# Patient Record
Sex: MALE | URBAN - NONMETROPOLITAN AREA
[De-identification: names, ages, dates, MRNs, and addresses within clinical notes are randomized per-mention and may not be internally consistent; named-entity substitution may affect disease eponyms.]

---

## 2019-09-25 ENCOUNTER — APPOINTMENT (RX ONLY)
Dept: URBAN - NONMETROPOLITAN AREA CLINIC 15 | Facility: CLINIC | Age: 69
Setting detail: DERMATOLOGY
End: 2019-09-25

## 2019-09-25 DIAGNOSIS — L82.1 OTHER SEBORRHEIC KERATOSIS: ICD-10-CM

## 2019-09-25 DIAGNOSIS — L91.8 OTHER HYPERTROPHIC DISORDERS OF THE SKIN: ICD-10-CM

## 2019-09-25 DIAGNOSIS — Z71.89 OTHER SPECIFIED COUNSELING: ICD-10-CM

## 2019-09-25 DIAGNOSIS — L73.8 OTHER SPECIFIED FOLLICULAR DISORDERS: ICD-10-CM

## 2019-09-25 PROBLEM — E13.9 OTHER SPECIFIED DIABETES MELLITUS WITHOUT COMPLICATIONS: Status: ACTIVE | Noted: 2019-09-25

## 2019-09-25 PROCEDURE — 99202 OFFICE O/P NEW SF 15 MIN: CPT

## 2019-09-25 PROCEDURE — ? COUNSELING

## 2019-09-25 PROCEDURE — ? LIQUID NITROGEN

## 2019-09-25 ASSESSMENT — LOCATION DETAILED DESCRIPTION DERM
LOCATION DETAILED: LEFT LATERAL SUBMANDIBULAR CHEEK
LOCATION DETAILED: RIGHT LATERAL BUCCAL CHEEK
LOCATION DETAILED: LEFT INFERIOR CENTRAL MALAR CHEEK
LOCATION DETAILED: LEFT CENTRAL LATERAL NECK
LOCATION DETAILED: LEFT SUPERIOR ANTERIOR NECK
LOCATION DETAILED: LEFT LATERAL ZYGOMA
LOCATION DETAILED: LEFT SUPERIOR CENTRAL MALAR CHEEK
LOCATION DETAILED: LEFT INFERIOR FOREHEAD
LOCATION DETAILED: RIGHT INFERIOR CENTRAL MALAR CHEEK
LOCATION DETAILED: RIGHT CENTRAL MANDIBULAR CHEEK
LOCATION DETAILED: RIGHT LATERAL MANDIBULAR CHEEK

## 2019-09-25 ASSESSMENT — LOCATION SIMPLE DESCRIPTION DERM
LOCATION SIMPLE: NECK
LOCATION SIMPLE: LEFT CHEEK
LOCATION SIMPLE: LEFT ANTERIOR NECK
LOCATION SIMPLE: LEFT ZYGOMA
LOCATION SIMPLE: LEFT FOREHEAD
LOCATION SIMPLE: RIGHT CHEEK

## 2019-09-25 ASSESSMENT — LOCATION ZONE DERM
LOCATION ZONE: NECK
LOCATION ZONE: FACE

## 2019-09-25 NOTE — PROCEDURE: LIQUID NITROGEN
Bill Insurance (You Assume Risk Of Denial Or Audit By Selecting Yes): No
Detail Level: Detailed
Duration Of Freeze Thaw-Cycle (Seconds): 0
Medical Necessity Information: It is in your best interest to select a reason for this procedure from the list below. All of these items fulfill various CMS LCD requirements except the new and changing color options.
Consent: The patient's consent was obtained including but not limited to risks of crusting, scabbing, blistering, scarring, darker or lighter pigmentary change, recurrence, incomplete removal and infection.
Medical Necessity Clause: This procedure was medically necessary because the lesions that were treated were:  If lesion does not resolve, bx is needed.
Post-Care Instructions: I reviewed with the patient in detail post-care instructions. Patient is to wear sunprotection, and avoid picking at any of the treated lesions. Pt may apply Vaseline to crusted or scabbing areas.

## 2019-12-27 ENCOUNTER — TELEPHONE (OUTPATIENT)
Dept: CARDIOLOGY | Facility: MEDICAL CENTER | Age: 69
End: 2019-12-27

## 2019-12-27 ENCOUNTER — OFFICE VISIT (OUTPATIENT)
Dept: CARDIOLOGY | Facility: MEDICAL CENTER | Age: 69
End: 2019-12-27
Payer: MEDICARE

## 2019-12-27 VITALS
HEIGHT: 69 IN | SYSTOLIC BLOOD PRESSURE: 128 MMHG | BODY MASS INDEX: 46.04 KG/M2 | HEART RATE: 84 BPM | WEIGHT: 310.85 LBS | DIASTOLIC BLOOD PRESSURE: 84 MMHG | OXYGEN SATURATION: 95 %

## 2019-12-27 DIAGNOSIS — I47.10 PAROXYSMAL SUPRAVENTRICULAR TACHYCARDIA: ICD-10-CM

## 2019-12-27 DIAGNOSIS — E11.69 TYPE 2 DIABETES MELLITUS WITH OTHER SPECIFIED COMPLICATION, WITHOUT LONG-TERM CURRENT USE OF INSULIN (HCC): ICD-10-CM

## 2019-12-27 DIAGNOSIS — R94.31 ABNORMAL ELECTROCARDIOGRAM (ECG) (EKG): ICD-10-CM

## 2019-12-27 DIAGNOSIS — R06.09 DYSPNEA ON EXERTION: ICD-10-CM

## 2019-12-27 PROBLEM — E11.9 DIABETES MELLITUS (HCC): Status: ACTIVE | Noted: 2019-12-27

## 2019-12-27 LAB — EKG IMPRESSION: NORMAL

## 2019-12-27 PROCEDURE — 93000 ELECTROCARDIOGRAM COMPLETE: CPT | Performed by: INTERNAL MEDICINE

## 2019-12-27 PROCEDURE — 99204 OFFICE O/P NEW MOD 45 MIN: CPT | Performed by: INTERNAL MEDICINE

## 2019-12-27 RX ORDER — GLIPIZIDE 10 MG/1
10 TABLET ORAL 3 TIMES DAILY
COMMUNITY
Start: 2019-10-29

## 2019-12-27 RX ORDER — SIMVASTATIN 80 MG
80 TABLET ORAL
COMMUNITY
Start: 2019-10-21 | End: 2019-12-27

## 2019-12-27 RX ORDER — TRAMADOL HYDROCHLORIDE 50 MG/1
50 TABLET ORAL
COMMUNITY
Start: 2019-11-06 | End: 2021-08-02

## 2019-12-27 RX ORDER — GABAPENTIN 600 MG/1
600 TABLET ORAL 2 TIMES DAILY
COMMUNITY
Start: 2019-11-11

## 2019-12-27 RX ORDER — ROSUVASTATIN CALCIUM 20 MG/1
20 TABLET, COATED ORAL EVERY EVENING
Qty: 30 TAB | Refills: 11 | Status: SHIPPED | OUTPATIENT
Start: 2019-12-27 | End: 2020-12-02 | Stop reason: SDUPTHER

## 2019-12-27 RX ORDER — BACLOFEN 10 MG/1
TABLET ORAL 4 TIMES DAILY PRN
COMMUNITY
Start: 2019-11-25 | End: 2021-08-02

## 2019-12-27 RX ORDER — LISINOPRIL 20 MG/1
20 TABLET ORAL 2 TIMES DAILY
COMMUNITY
Start: 2019-10-03

## 2019-12-27 SDOH — HEALTH STABILITY: MENTAL HEALTH: HOW OFTEN DO YOU HAVE A DRINK CONTAINING ALCOHOL?: NEVER

## 2019-12-27 ASSESSMENT — ENCOUNTER SYMPTOMS
DIARRHEA: 0
DECREASED APPETITE: 0
BACK PAIN: 0
NAUSEA: 0
PALPITATIONS: 0
SHORTNESS OF BREATH: 0
COUGH: 0
VOMITING: 0
FLANK PAIN: 0
HEARTBURN: 0
ABDOMINAL PAIN: 0
BLURRED VISION: 0
CLAUDICATION: 0
FEVER: 0
DEPRESSION: 0
ORTHOPNEA: 0
IRREGULAR HEARTBEAT: 0
WEIGHT GAIN: 0
DIZZINESS: 0
PND: 0
CONSTIPATION: 0
DYSPNEA ON EXERTION: 1
NEAR-SYNCOPE: 0
SYNCOPE: 0
WEIGHT LOSS: 0
ALTERED MENTAL STATUS: 0

## 2019-12-27 NOTE — TELEPHONE ENCOUNTER
VR    Pt called to discuss recent med changes following last visit, needs clarification on atorvastatin directions. #[phone]

## 2019-12-27 NOTE — PROGRESS NOTES
Cardiology Note    Chief Complaint   Patient presents with   • Tachycardia       History of Present Illness: Jose Alvarez is a 69 y.o. male PMH DM, HTN, LUCIO, CKD who presents for initial visit.    Exerts by walking. States neuropathy stops him usually, however, when walks faster than usual or in a bigger mall after about 200 ft he will get short of breath.     Follows renal function with PCP.     Father with MI in his 40s. Describes wife passed away from cancer a few years ago. She used to smoke. He does not.    Review of Systems   Constitution: Negative for decreased appetite, fever, malaise/fatigue, weight gain and weight loss.   HENT: Negative for congestion and nosebleeds.    Eyes: Negative for blurred vision.   Cardiovascular: Positive for dyspnea on exertion. Negative for chest pain, claudication, irregular heartbeat, leg swelling, near-syncope, orthopnea, palpitations, paroxysmal nocturnal dyspnea and syncope.   Respiratory: Negative for cough and shortness of breath.    Endocrine: Negative for cold intolerance and heat intolerance.   Skin: Negative for rash.   Musculoskeletal: Negative for back pain.   Gastrointestinal: Negative for abdominal pain, constipation, diarrhea, heartburn, melena, nausea and vomiting.   Genitourinary: Negative for dysuria, flank pain and hematuria.   Neurological: Negative for dizziness.   Psychiatric/Behavioral: Negative for altered mental status and depression.         History reviewed. No pertinent past medical history.      History reviewed. No pertinent surgical history.      Current Outpatient Medications   Medication Sig Dispense Refill   • gabapentin (NEURONTIN) 600 MG tablet TAKE 1 TABLET BY MOUTH IN THE EARLY AFTERNOON AND 1 TABLET AT BEDTIME     • glipiZIDE (GLUCOTROL) 10 MG Tab Take 10 mg by mouth.     • lisinopril (PRINIVIL) 20 MG Tab Take 20 mg by mouth.     • tramadol (ULTRAM) 50 MG Tab Take 50 mg by mouth.     • baclofen (LIORESAL) 10 MG Tab Take  by mouth 4 times a  day as needed.     • insulin NPH (NOVOLIN N) 100 UNIT/ML Suspension Novolin R Regular U-100 Insulin 100 unit/mL injection solution     • rosuvastatin (CRESTOR) 20 MG Tab Take 1 Tab by mouth every evening. 30 Tab 11   • aspirin EC (ECOTRIN) 81 MG Tablet Delayed Response Take 1 Tab by mouth every day. 90 Tab 3     No current facility-administered medications for this visit.          Allergies   Allergen Reactions   • Penicillins    • Sulfa Drugs          History reviewed. No pertinent family history.      Social History     Socioeconomic History   • Marital status:      Spouse name: Not on file   • Number of children: Not on file   • Years of education: Not on file   • Highest education level: Not on file   Occupational History   • Not on file   Social Needs   • Financial resource strain: Not on file   • Food insecurity:     Worry: Not on file     Inability: Not on file   • Transportation needs:     Medical: Not on file     Non-medical: Not on file   Tobacco Use   • Smoking status: Never Smoker   • Smokeless tobacco: Never Used   Substance and Sexual Activity   • Alcohol use: Never     Frequency: Never   • Drug use: Never   • Sexual activity: Not on file   Lifestyle   • Physical activity:     Days per week: Not on file     Minutes per session: Not on file   • Stress: Not on file   Relationships   • Social connections:     Talks on phone: Not on file     Gets together: Not on file     Attends [employer] service: Not on file     Active member of club or organization: Not on file     Attends meetings of clubs or organizations: Not on file     Relationship status: Not on file   • Intimate partner violence:     Fear of current or ex partner: Not on file     Emotionally abused: Not on file     Physically abused: Not on file     Forced sexual activity: Not on file   Other Topics Concern   • Not on file   Social History Narrative   • Not on file         Physical Exam:  Ambulatory Vitals  /84 (BP Location: Right  "arm, Patient Position: Sitting, BP Cuff Size: Large adult)   Pulse 84   Ht 1.74 m (5' 8.5\")   Wt (!) 141 kg (310 lb 13.6 oz)   SpO2 95%    BP Readings from Last 4 Encounters:   12/27/19 128/84     Weight/BMI:   Vitals:    12/27/19 1029   BP: 128/84   Weight: (!) 141 kg (310 lb 13.6 oz)   Height: 1.74 m (5' 8.5\")    Body mass index is 46.58 kg/m².  Wt Readings from Last 4 Encounters:   12/27/19 (!) 141 kg (310 lb 13.6 oz)       Physical Exam   Constitutional: He is oriented to person, place, and time and well-developed, well-nourished, and in no distress. No distress.   HENT:   Head: Normocephalic and atraumatic.   Eyes: Pupils are equal, round, and reactive to light. Conjunctivae are normal.   Neck: Normal range of motion. Neck supple. No JVD present.   Cardiovascular: Normal rate, regular rhythm, normal heart sounds and intact distal pulses. Exam reveals no gallop and no friction rub.   No murmur heard.  Pulmonary/Chest: Effort normal and breath sounds normal. No respiratory distress. He has no wheezes. He has no rales. He exhibits no tenderness.   Abdominal: Soft. Bowel sounds are normal. He exhibits no distension.   Musculoskeletal:         General: No edema.   Neurological: He is alert and oriented to person, place, and time.   Skin: Skin is warm and dry.   Psychiatric: Affect and judgment normal.       Lab Data Review:  No results found for: CHOLSTRLTOT, LDL, HDL, TRIGLYCERIDE    No results found for: SODIUM, POTASSIUM, CHLORIDE, CO2, GLUCOSE, BUN, CREATININE, BUNCREATRAT, GLOMRATE  CrCl cannot be calculated (No successful lab value found.).  No results found for: ALKPHOSPHAT, ASTSGOT, ALTSGPT, TBILIRUBIN   No results found for: WBC, HCT  No results found for: HBA1C  No components found for: TROP      Cardiac Imaging and Procedures Review:        Medical Decision Making:  Problem List Items Addressed This Visit     Dyspnea on exertion     Rule out cardiac etiology w stress and echo.          Relevant Orders "    EC-ECHOCARDIOGRAM COMPLETE W/O CONT    NM-CARDIAC PET    Diabetes mellitus (HCC)     elev 10 yr risk. High intensity statin and low dose aspirin.          Relevant Medications    glipiZIDE (GLUCOTROL) 10 MG Tab    insulin NPH (NOVOLIN N) 100 UNIT/ML Suspension      Other Visit Diagnoses     Paroxysmal supraventricular tachycardia (HCC)        Relevant Medications    lisinopril (PRINIVIL) 20 MG Tab    rosuvastatin (CRESTOR) 20 MG Tab    Other Relevant Orders    EKG (Completed)    Abnormal electrocardiogram (ECG) (EKG)         Relevant Orders    NM-CARDIAC PET          It was my pleasure to meet with Mr. Alvarez.

## 2020-01-06 NOTE — TELEPHONE ENCOUNTER
Contacted patient.  He states he was looking at the wrong medication.  He denies having any questions about current medications or any concerns.

## 2020-01-22 ENCOUNTER — TELEPHONE (OUTPATIENT)
Dept: SCHEDULING | Facility: IMAGING CENTER | Age: 70
End: 2020-01-22

## 2020-01-31 ENCOUNTER — OFFICE VISIT (OUTPATIENT)
Dept: URGENT CARE | Facility: PHYSICIAN GROUP | Age: 70
End: 2020-01-31
Payer: MEDICARE

## 2020-01-31 VITALS
TEMPERATURE: 98.1 F | DIASTOLIC BLOOD PRESSURE: 68 MMHG | SYSTOLIC BLOOD PRESSURE: 118 MMHG | RESPIRATION RATE: 16 BRPM | HEART RATE: 88 BPM | OXYGEN SATURATION: 96 % | BODY MASS INDEX: 45.91 KG/M2 | HEIGHT: 69 IN | WEIGHT: 310 LBS

## 2020-01-31 DIAGNOSIS — J06.9 URI WITH COUGH AND CONGESTION: ICD-10-CM

## 2020-01-31 PROCEDURE — 99204 OFFICE O/P NEW MOD 45 MIN: CPT | Performed by: PHYSICIAN ASSISTANT

## 2020-01-31 RX ORDER — FLUTICASONE PROPIONATE 50 MCG
1 SPRAY, SUSPENSION (ML) NASAL DAILY
Qty: 16 G | Refills: 0 | Status: SHIPPED | OUTPATIENT
Start: 2020-01-31 | End: 2020-07-21

## 2020-01-31 ASSESSMENT — ENCOUNTER SYMPTOMS
SHORTNESS OF BREATH: 0
HEADACHES: 0
SORE THROAT: 1
EYE REDNESS: 0
VOMITING: 0
FEVER: 0
NAUSEA: 0
EYE DISCHARGE: 0
SINUS PAIN: 0
COUGH: 1
MYALGIAS: 0

## 2020-01-31 NOTE — PROGRESS NOTES
Subjective:      Jose Alvarez is a 69 y.o. male who presents with Otalgia (L ear)        Otalgia    There is pain in both ears. This is a new problem. Episode onset: x 1 week ago. The problem occurs constantly. The problem has been gradually worsening. There has been no fever. The pain is mild. Associated symptoms include coughing (The patient reports an associated productive coughl) and a sore throat (now improved). Pertinent negatives include no ear discharge, headaches, rash or vomiting. Treatments tried: Nyquil. The treatment provided mild relief.     The patient states he has been sick with cold-like symptoms for the past month. The patient reports associated cough and congestion.  Patient states he developed bilateral ear pain x1 week ago.  The patient describes the pain as feeling congestion.  No fever.    PMH:  has no past medical history on file.  MEDS:   Current Outpatient Medications:   •  gabapentin (NEURONTIN) 600 MG tablet, TAKE 1 TABLET BY MOUTH IN THE EARLY AFTERNOON AND 1 TABLET AT BEDTIME, Disp: , Rfl:   •  glipiZIDE (GLUCOTROL) 10 MG Tab, Take 10 mg by mouth., Disp: , Rfl:   •  lisinopril (PRINIVIL) 20 MG Tab, Take 20 mg by mouth., Disp: , Rfl:   •  tramadol (ULTRAM) 50 MG Tab, Take 50 mg by mouth., Disp: , Rfl:   •  baclofen (LIORESAL) 10 MG Tab, Take  by mouth 4 times a day as needed., Disp: , Rfl:   •  insulin NPH (NOVOLIN N) 100 UNIT/ML Suspension, Novolin R Regular U-100 Insulin 100 unit/mL injection solution, Disp: , Rfl:   •  rosuvastatin (CRESTOR) 20 MG Tab, Take 1 Tab by mouth every evening., Disp: 30 Tab, Rfl: 11  •  aspirin EC (ECOTRIN) 81 MG Tablet Delayed Response, Take 1 Tab by mouth every day., Disp: 90 Tab, Rfl: 3  ALLERGIES:   Allergies   Allergen Reactions   • Penicillins    • Sulfa Drugs      SURGHX: No past surgical history on file.  SOCHX:  reports that he has never smoked. He has never used smokeless tobacco. He reports that he does not drink alcohol or use drugs.  FH: Family  "history was reviewed, no pertinent findings to report      Review of Systems   Constitutional: Negative for fever.   HENT: Positive for congestion, ear pain and sore throat (now improved). Negative for ear discharge and sinus pain.    Eyes: Negative for discharge and redness.   Respiratory: Positive for cough (The patient reports an associated productive coughl). Negative for shortness of breath.    Gastrointestinal: Negative for nausea and vomiting.   Musculoskeletal: Negative for myalgias.   Skin: Negative for rash.   Neurological: Negative for headaches.   All other systems reviewed and are negative.         Objective:     /68 (BP Location: Right arm, Patient Position: Sitting, BP Cuff Size: Large adult)   Pulse 88   Temp 36.7 °C (98.1 °F) (Temporal)   Resp 16   Ht 1.74 m (5' 8.5\")   Wt (!) 140.6 kg (310 lb)   SpO2 96%   BMI 46.45 kg/m²      Physical Exam  Constitutional:       Appearance: Normal appearance.   HENT:      Head: Normocephalic and atraumatic.      Right Ear: Ear canal and external ear normal. There is impacted cerumen.      Left Ear: Tympanic membrane, ear canal and external ear normal.      Nose: Nose normal.      Mouth/Throat:      Mouth: Mucous membranes are moist.      Pharynx: Oropharynx is clear. No posterior oropharyngeal erythema.   Eyes:      Extraocular Movements: Extraocular movements intact.      Conjunctiva/sclera: Conjunctivae normal.   Neck:      Musculoskeletal: Normal range of motion and neck supple.   Cardiovascular:      Rate and Rhythm: Normal rate and regular rhythm.      Heart sounds: Normal heart sounds.   Pulmonary:      Effort: Pulmonary effort is normal. No respiratory distress.      Breath sounds: Normal breath sounds. No wheezing.   Musculoskeletal: Normal range of motion.   Skin:     General: Skin is warm and dry.   Neurological:      Mental Status: He is alert and oriented to person, place, and time.            Progress:  Ear Lavage:   Successful removal of " cerumen from the right ear canal via lavage.  On reexamination, the patient's right TM was clear without erythema.     Assessment/Plan:       1. URI with cough and congestion  - fluticasone (FLONASE) 50 MCG/ACT nasal spray; Spray 1 Spray in nose every day.  Dispense: 16 g; Refill: 0    Discussed likely viral etiology with the patient.  Recommend OTC medications and supportive care for symptomatic management.  Recommend the patient follow-up with his PCP.  Discussed return precautions with the patient, and he verbalized understanding.    Differential diagnoses, supportive care, and indications for immediate follow-up discussed with patient.   Instructed to return to clinic or nearest emergency department for any change in condition, further concerns, or worsening of symptoms.    OTC Tylenol or Motrin for fever/discomfort.  OTC cough/cold medication for symptomatic relief  OTC oral decongestant for symptomatic relief  OTC Supportive Care for Congestion - saline nasal spray or neti pot  Drink plenty of fluids  Follow-up with PCP  Return to clinic or go to the ED if symptoms worsen or fail to improve, or if the patient should develop worsening/increasing cough, congestion, ear pain, sore throat, shortness of breath, wheezing, chest pain, fever/chills, and/or any concerning symptoms.    Discussed plan with the patient, and he agrees to the above.

## 2020-02-12 ENCOUNTER — OFFICE VISIT (OUTPATIENT)
Dept: MEDICAL GROUP | Facility: CLINIC | Age: 70
End: 2020-02-12
Payer: MEDICARE

## 2020-02-12 VITALS
WEIGHT: 310.8 LBS | DIASTOLIC BLOOD PRESSURE: 80 MMHG | BODY MASS INDEX: 46.03 KG/M2 | SYSTOLIC BLOOD PRESSURE: 140 MMHG | TEMPERATURE: 99 F | OXYGEN SATURATION: 94 % | HEIGHT: 69 IN | HEART RATE: 94 BPM

## 2020-02-12 DIAGNOSIS — E78.49 OTHER HYPERLIPIDEMIA: ICD-10-CM

## 2020-02-12 DIAGNOSIS — I10 ESSENTIAL HYPERTENSION: ICD-10-CM

## 2020-02-12 DIAGNOSIS — E11.42 DIABETIC POLYNEUROPATHY ASSOCIATED WITH TYPE 2 DIABETES MELLITUS (HCC): ICD-10-CM

## 2020-02-12 DIAGNOSIS — M62.830 BACK MUSCLE SPASM: ICD-10-CM

## 2020-02-12 DIAGNOSIS — Z23 NEED FOR VACCINATION: ICD-10-CM

## 2020-02-12 DIAGNOSIS — Z76.89 ENCOUNTER TO ESTABLISH CARE WITH NEW DOCTOR: ICD-10-CM

## 2020-02-12 DIAGNOSIS — E66.01 MORBID OBESITY (HCC): ICD-10-CM

## 2020-02-12 DIAGNOSIS — Z79.4 TYPE 2 DIABETES MELLITUS WITH DIABETIC POLYNEUROPATHY, WITH LONG-TERM CURRENT USE OF INSULIN (HCC): ICD-10-CM

## 2020-02-12 DIAGNOSIS — I47.10 PAROXYSMAL SUPRAVENTRICULAR TACHYCARDIA (HCC): ICD-10-CM

## 2020-02-12 DIAGNOSIS — E11.42 TYPE 2 DIABETES MELLITUS WITH DIABETIC POLYNEUROPATHY, WITH LONG-TERM CURRENT USE OF INSULIN (HCC): ICD-10-CM

## 2020-02-12 DIAGNOSIS — M25.532 LEFT WRIST PAIN: ICD-10-CM

## 2020-02-12 PROBLEM — E11.49 TYPE 2 DIABETES MELLITUS WITH NEUROLOGIC COMPLICATION, WITH LONG-TERM CURRENT USE OF INSULIN (HCC): Status: ACTIVE | Noted: 2019-12-27

## 2020-02-12 PROBLEM — H91.92 HEARING DEFICIT, LEFT: Status: ACTIVE | Noted: 2020-02-12

## 2020-02-12 PROBLEM — J45.909 ASTHMA DUE TO ENVIRONMENTAL ALLERGIES: Status: ACTIVE | Noted: 2020-02-12

## 2020-02-12 LAB
HBA1C MFR BLD: 9.1 % (ref 0–5.6)
INT CON NEG: ABNORMAL
INT CON POS: ABNORMAL

## 2020-02-12 PROCEDURE — 90662 IIV NO PRSV INCREASED AG IM: CPT | Performed by: FAMILY MEDICINE

## 2020-02-12 PROCEDURE — G0009 ADMIN PNEUMOCOCCAL VACCINE: HCPCS | Performed by: FAMILY MEDICINE

## 2020-02-12 PROCEDURE — 83036 HEMOGLOBIN GLYCOSYLATED A1C: CPT | Performed by: FAMILY MEDICINE

## 2020-02-12 PROCEDURE — 90670 PCV13 VACCINE IM: CPT | Performed by: FAMILY MEDICINE

## 2020-02-12 PROCEDURE — 99214 OFFICE O/P EST MOD 30 MIN: CPT | Mod: 25 | Performed by: FAMILY MEDICINE

## 2020-02-12 PROCEDURE — G0008 ADMIN INFLUENZA VIRUS VAC: HCPCS | Performed by: FAMILY MEDICINE

## 2020-02-12 ASSESSMENT — PATIENT HEALTH QUESTIONNAIRE - PHQ9: CLINICAL INTERPRETATION OF PHQ2 SCORE: 0

## 2020-02-12 NOTE — ASSESSMENT & PLAN NOTE
Managed with Glucotrol and NPH insulin. Never tried any of the other newer injectables. No hypo spells. No rashes. Gets eyes checked yearly by optometrist.

## 2020-02-12 NOTE — ASSESSMENT & PLAN NOTE
Previously followed by Dr. Sherman in [place].   Moved to [place] after death of his wife. Retired utilities worker.

## 2020-02-12 NOTE — PROGRESS NOTES
Complaint: Review medical issues, requesting referral to endo     Subjective:     Jose Alvarez is a 69 y.o. male here today accompanied by his daughter, who is also his caregiver.    Encounter to establish care with new doctor  Previously followed by Dr. Sherman in [place].   Moved to [place] after death of his wife. Retired utilities worker.    Left wrist pain  Pain at joint with ulna. Does not recall injuring it or straining it there.    Diabetic polyneuropathy associated with type 2 diabetes mellitus (HCC)  Managed with gabapentin 600 mg PM, hs daily. Checks feet daily.    Back muscle spasm  Managed with baclofen prn.     Type 2 diabetes mellitus with neurologic complication, with long-term current use of insulin (HCC)  Managed with Glucotrol and NPH insulin. Never tried any of the other newer injectables. No hypo spells. No rashes. Gets eyes checked yearly by optometrist.    Paroxysmal supraventricular tachycardia (HCC)  Seen by Dr. Kelley, never got procedures done. States no sxs, no need.    Morbid obesity ([facility])  Made aware that bariatric surgery would help with number of his problems.    Other hyperlipidemia  Managed with Crestor 20 mg daily.       Current medicines (including changes today)  Current Outpatient Medications   Medication Sig Dispense Refill   • insulin NPH (NOVOLIN N) 100 UNIT/ML Suspension 75-80 units twice a day 10 mL 5   • fluticasone (FLONASE) 50 MCG/ACT nasal spray Spray 1 Spray in nose every day. 16 g 0   • gabapentin (NEURONTIN) 600 MG tablet TAKE 1 TABLET BY MOUTH IN THE EARLY AFTERNOON AND 1 TABLET AT BEDTIME     • glipiZIDE (GLUCOTROL) 10 MG Tab Take 10 mg by mouth.     • lisinopril (PRINIVIL) 20 MG Tab Take 20 mg by mouth.     • tramadol (ULTRAM) 50 MG Tab Take 50 mg by mouth.     • baclofen (LIORESAL) 10 MG Tab Take  by mouth 4 times a day as needed.     • rosuvastatin (CRESTOR) 20 MG Tab Take 1 Tab by mouth every evening. 30 Tab 11   • aspirin EC (ECOTRIN) 81 MG Tablet Delayed Response  Take 1 Tab by mouth every day. 90 Tab 3     No current facility-administered medications for this visit.      He  has no past medical history on file.    Health Maintenance:      Allergies: Metformin; Penicillins; and Sulfa drugs    Current Outpatient Medications Ordered in Epic   Medication Sig Dispense Refill   • insulin NPH (NOVOLIN N) 100 UNIT/ML Suspension 75-80 units twice a day 10 mL 5   • fluticasone (FLONASE) 50 MCG/ACT nasal spray Spray 1 Spray in nose every day. 16 g 0   • gabapentin (NEURONTIN) 600 MG tablet TAKE 1 TABLET BY MOUTH IN THE EARLY AFTERNOON AND 1 TABLET AT BEDTIME     • glipiZIDE (GLUCOTROL) 10 MG Tab Take 10 mg by mouth.     • lisinopril (PRINIVIL) 20 MG Tab Take 20 mg by mouth.     • tramadol (ULTRAM) 50 MG Tab Take 50 mg by mouth.     • baclofen (LIORESAL) 10 MG Tab Take  by mouth 4 times a day as needed.     • rosuvastatin (CRESTOR) 20 MG Tab Take 1 Tab by mouth every evening. 30 Tab 11   • aspirin EC (ECOTRIN) 81 MG Tablet Delayed Response Take 1 Tab by mouth every day. 90 Tab 3     No current Epic-ordered facility-administered medications on file.        History reviewed. No pertinent past medical history.    History reviewed. No pertinent surgical history.    Social History     Tobacco Use   • Smoking status: Never Smoker   • Smokeless tobacco: Never Used   Substance Use Topics   • Alcohol use: Never     Frequency: Never   • Drug use: Never       Social History     Social History Narrative   • Not on file       History reviewed. No pertinent family history.      ROS   Patient denies any fever, chills, unintentional weight gain/loss, fatigue, stroke symptoms, dizziness, headache, nasal congestion, sore-throat, cough, heartburn, chest pain, difficulty breathing, abdominal discomfort, diarrhea/constipation, burning with urination or frequency, joint or back pain, skin rashes, depression or anxiety.       Objective:     There were no vitals taken for this visit. There is no height or  weight on file to calculate BMI.   Physical Exam:  Constitutional: Alert, no distress. Morbidly obese.  Skin: Warm, dry, good turgor, no rashes in visible areas.  Eye: Equal, round and reactive, conjunctiva clear, lids normal.  ENMT: Lips without lesions, good dentition, oropharynx clear.  Neck: Trachea midline, no masses, no thyromegaly. No cervical or supraclavicular lymphadenopathy  Respiratory: Unlabored respiratory effort, lungs clear to auscultation, no wheezes, no ronchi.  Cardiovascular: RRR, Normal S1, S2, no murmur, no extremity edema.  Abdomen: Soft, non-tender, no masses, no hepatosplenomegaly.  Psych: Alert and oriented x3, appropriate affect and mood.        Assessment and Plan:   The following treatment plan was discussed    1. Diabetic polyneuropathy associated with type 2 diabetes mellitus (HCC)  Check feet daily, continue gabapentin    2. Need for vaccination  - INFLUENZA VACCINE, HIGH DOSE (65+ ONLY)  - Pneumococcal Conjugate Vaccine 13-Valent    3. Back muscle spasm  Controlled with Baclofen.    4. Other hyperlipidemia  Controlled with Crestor.    5. Essential hypertension  Controlled on meds.    6. Morbid obesity (HCC)  Recommend bariatric surgery, he will think it over. Reviewed dietary measures.    7. Paroxysmal supraventricular tachycardia (HCC)  Observe, f/u with cards prn.    8. Type 2 diabetes mellitus with diabetic polyneuropathy, with long-term current use of insulin (HCC)  A1c 9.1% today. Will ref er to endo upon his wish.  - REFERRAL TO ENDOCRINOLOGY  - POCT Hemoglobin A1C  - CBC WITH DIFFERENTIAL; Future  - Comp Metabolic Panel; Future  - Lipid Profile; Future  - TSH WITH REFLEX TO FT4; Future  - MICROALBUMIN CREAT RATIO URINE; Future    9. Left wrist pain  Benign, topicals as needed.        Followup: Return in about 6 months (around 8/12/2020), or if symptoms worsen or fail to improve.    Please note that this dictation was created using voice recognition software. I have made every  reasonable attempt to correct obvious errors, but I expect that there are errors of grammar and possibly content that I did not discover before finalizing the note.

## 2020-02-14 ENCOUNTER — HOSPITAL ENCOUNTER (OUTPATIENT)
Dept: LAB | Facility: MEDICAL CENTER | Age: 70
End: 2020-02-14
Attending: FAMILY MEDICINE
Payer: MEDICARE

## 2020-02-14 DIAGNOSIS — E11.42 TYPE 2 DIABETES MELLITUS WITH DIABETIC POLYNEUROPATHY, WITH LONG-TERM CURRENT USE OF INSULIN (HCC): ICD-10-CM

## 2020-02-14 DIAGNOSIS — Z79.4 TYPE 2 DIABETES MELLITUS WITH DIABETIC POLYNEUROPATHY, WITH LONG-TERM CURRENT USE OF INSULIN (HCC): ICD-10-CM

## 2020-02-14 LAB
ALBUMIN SERPL BCP-MCNC: 4 G/DL (ref 3.2–4.9)
ALBUMIN/GLOB SERPL: 1.2 G/DL
ALP SERPL-CCNC: 86 U/L (ref 30–99)
ALT SERPL-CCNC: 29 U/L (ref 2–50)
ANION GAP SERPL CALC-SCNC: 10 MMOL/L (ref 0–11.9)
AST SERPL-CCNC: 32 U/L (ref 12–45)
BASOPHILS # BLD AUTO: 0.6 % (ref 0–1.8)
BASOPHILS # BLD: 0.04 K/UL (ref 0–0.12)
BILIRUB SERPL-MCNC: 0.5 MG/DL (ref 0.1–1.5)
BUN SERPL-MCNC: 23 MG/DL (ref 8–22)
CALCIUM SERPL-MCNC: 8.8 MG/DL (ref 8.5–10.5)
CHLORIDE SERPL-SCNC: 104 MMOL/L (ref 96–112)
CHOLEST SERPL-MCNC: 100 MG/DL (ref 100–199)
CO2 SERPL-SCNC: 22 MMOL/L (ref 20–33)
CREAT SERPL-MCNC: 1.29 MG/DL (ref 0.5–1.4)
CREAT UR-MCNC: 166.9 MG/DL
EOSINOPHIL # BLD AUTO: 0.44 K/UL (ref 0–0.51)
EOSINOPHIL NFR BLD: 6.8 % (ref 0–6.9)
ERYTHROCYTE [DISTWIDTH] IN BLOOD BY AUTOMATED COUNT: 43.9 FL (ref 35.9–50)
FASTING STATUS PATIENT QL REPORTED: NORMAL
GLOBULIN SER CALC-MCNC: 3.3 G/DL (ref 1.9–3.5)
GLUCOSE SERPL-MCNC: 241 MG/DL (ref 65–99)
HCT VFR BLD AUTO: 46.5 % (ref 42–52)
HDLC SERPL-MCNC: 29 MG/DL
HGB BLD-MCNC: 15.5 G/DL (ref 14–18)
IMM GRANULOCYTES # BLD AUTO: 0.02 K/UL (ref 0–0.11)
IMM GRANULOCYTES NFR BLD AUTO: 0.3 % (ref 0–0.9)
LDLC SERPL CALC-MCNC: 49 MG/DL
LYMPHOCYTES # BLD AUTO: 1.54 K/UL (ref 1–4.8)
LYMPHOCYTES NFR BLD: 23.8 % (ref 22–41)
MCH RBC QN AUTO: 30.8 PG (ref 27–33)
MCHC RBC AUTO-ENTMCNC: 33.3 G/DL (ref 33.7–35.3)
MCV RBC AUTO: 92.4 FL (ref 81.4–97.8)
MICROALBUMIN UR-MCNC: 6.7 MG/DL
MICROALBUMIN/CREAT UR: 40 MG/G (ref 0–30)
MONOCYTES # BLD AUTO: 0.51 K/UL (ref 0–0.85)
MONOCYTES NFR BLD AUTO: 7.9 % (ref 0–13.4)
NEUTROPHILS # BLD AUTO: 3.91 K/UL (ref 1.82–7.42)
NEUTROPHILS NFR BLD: 60.6 % (ref 44–72)
NRBC # BLD AUTO: 0 K/UL
NRBC BLD-RTO: 0 /100 WBC
PLATELET # BLD AUTO: 164 K/UL (ref 164–446)
PMV BLD AUTO: 10.4 FL (ref 9–12.9)
POTASSIUM SERPL-SCNC: 3.8 MMOL/L (ref 3.6–5.5)
PROT SERPL-MCNC: 7.3 G/DL (ref 6–8.2)
RBC # BLD AUTO: 5.03 M/UL (ref 4.7–6.1)
SODIUM SERPL-SCNC: 136 MMOL/L (ref 135–145)
TRIGL SERPL-MCNC: 111 MG/DL (ref 0–149)
TSH SERPL DL<=0.005 MIU/L-ACNC: 0.76 UIU/ML (ref 0.38–5.33)
WBC # BLD AUTO: 6.5 K/UL (ref 4.8–10.8)

## 2020-02-14 PROCEDURE — 85025 COMPLETE CBC W/AUTO DIFF WBC: CPT

## 2020-02-14 PROCEDURE — 82570 ASSAY OF URINE CREATININE: CPT

## 2020-02-14 PROCEDURE — 80053 COMPREHEN METABOLIC PANEL: CPT

## 2020-02-14 PROCEDURE — 36415 COLL VENOUS BLD VENIPUNCTURE: CPT

## 2020-02-14 PROCEDURE — 80061 LIPID PANEL: CPT

## 2020-02-14 PROCEDURE — 82043 UR ALBUMIN QUANTITATIVE: CPT

## 2020-02-14 PROCEDURE — 84443 ASSAY THYROID STIM HORMONE: CPT

## 2020-02-17 ENCOUNTER — TELEPHONE (OUTPATIENT)
Dept: MEDICAL GROUP | Facility: CLINIC | Age: 70
End: 2020-02-17

## 2020-02-17 PROBLEM — N18.30 CHRONIC RENAL INSUFFICIENCY, STAGE 3 (MODERATE): Status: ACTIVE | Noted: 2020-02-17

## 2020-03-19 ENCOUNTER — APPOINTMENT (OUTPATIENT)
Dept: ENDOCRINOLOGY | Facility: MEDICAL CENTER | Age: 70
End: 2020-03-19
Payer: MEDICARE

## 2020-07-21 ENCOUNTER — OFFICE VISIT (OUTPATIENT)
Dept: ENDOCRINOLOGY | Facility: MEDICAL CENTER | Age: 70
End: 2020-07-21
Attending: INTERNAL MEDICINE
Payer: MEDICARE

## 2020-07-21 VITALS
HEART RATE: 93 BPM | HEIGHT: 69 IN | BODY MASS INDEX: 46.21 KG/M2 | SYSTOLIC BLOOD PRESSURE: 160 MMHG | DIASTOLIC BLOOD PRESSURE: 70 MMHG | WEIGHT: 312 LBS | OXYGEN SATURATION: 94 %

## 2020-07-21 DIAGNOSIS — E78.49 OTHER HYPERLIPIDEMIA: ICD-10-CM

## 2020-07-21 DIAGNOSIS — I10 ESSENTIAL HYPERTENSION: ICD-10-CM

## 2020-07-21 DIAGNOSIS — E11.42 TYPE 2 DIABETES MELLITUS WITH DIABETIC POLYNEUROPATHY, WITH LONG-TERM CURRENT USE OF INSULIN (HCC): ICD-10-CM

## 2020-07-21 DIAGNOSIS — Z79.4 LONG-TERM INSULIN USE (HCC): ICD-10-CM

## 2020-07-21 DIAGNOSIS — Z79.4 TYPE 2 DIABETES MELLITUS WITH DIABETIC POLYNEUROPATHY, WITH LONG-TERM CURRENT USE OF INSULIN (HCC): ICD-10-CM

## 2020-07-21 LAB
HBA1C MFR BLD: 8.1 % (ref 0–5.6)
INT CON NEG: NEGATIVE
INT CON POS: POSITIVE

## 2020-07-21 PROCEDURE — 99204 OFFICE O/P NEW MOD 45 MIN: CPT | Performed by: INTERNAL MEDICINE

## 2020-07-21 PROCEDURE — 99212 OFFICE O/P EST SF 10 MIN: CPT | Performed by: INTERNAL MEDICINE

## 2020-07-21 PROCEDURE — 83036 HEMOGLOBIN GLYCOSYLATED A1C: CPT | Performed by: INTERNAL MEDICINE

## 2020-07-21 RX ORDER — SEMAGLUTIDE 1.34 MG/ML
0.5 INJECTION, SOLUTION SUBCUTANEOUS
Qty: 1 PEN | Refills: 11 | Status: SHIPPED | OUTPATIENT
Start: 2020-07-21 | End: 2020-12-01

## 2020-07-21 ASSESSMENT — FIBROSIS 4 INDEX: FIB4 SCORE: 2.46

## 2020-07-21 NOTE — PROGRESS NOTES
"Chief Complaint:  Consult requested by Jorge Giang M.D. for initial evaluation of Type 2 Diabetes Mellitus    HPI:   Jose Alvarez is a 70 y.o. male with Type 2 Diabetes Mellitus diagnosed in 1995.  He denies hospitalizations for DKA in the past.    His a1c was elevated at 8.0%.  He is taking Novolin R 75u bid and Glipizide  10mg QAC.  He cant tolerate metformin due to GI SE.    He monitors blood glucose  1 times per day. Blood glucose values range:Breakfast: 140, 150           He denies hypoglycemic episodes.    He  denies hypoglycemic unawareness. He denies episodes of severe hypoglycemia requiring third party assistance.  He  is not wearing a medical alert bracelet or necklace.  He does not a glucagon emergency kit.    He denies attending diabetes education classes.  Diet: \"unhealthy\" diet in general.  He eats 300 grams of CHO a day    Diabetes Complications   He  denies history of retinopathy.  He denies laser eye surgery. Last eye exam: April 2019 He does not recall the details.  He now sees Dr. Pandey in [place]   He reports history of peripheral sensory neuropathy.  He reports numbness, tingling in both feet.  He denies history of foot sores.   He reports history of kidney damage.  He is on ACE inhibitor or ARB.   He denies history of coronary artery disease.  He  denies history of stroke and denies TIA.  He denies history of PAD.  He reports history of hyperlipidemia.  He is taking Crestor      ROS:     CONS:     No fever, no chills, no weight loss, reports fatigue   EYES:      No diplopia, no blurry vision, no redness of eyes, no swelling of eyelids   ENT:    No hearing loss, No ear pain, No sore throat, no dysphagia, no neck swelling   CV:     No chest pain, no palpitations, no claudication, no orthopnea, no PND   PULM:    No SOB, no cough, no hemoptysis, no wheezing    GI:   No nausea, no vomiting, no diarrhea, no constipation, no bloody stools   :  Passing urine well, no dysuria, no hematuria "   ENDO:   reports polyuria, reports polydipsia, no heat intolerance, no cold intolerance   NEURO: No headaches, no dizziness, no convulsions, no tremors   MUSC:  No joint swellings, reports arthralgias, no myalgias, no weakness   SKIN:   No rash, no ulcers, no dry skin   PSYCH:   No depression, no anxiety, no difficulty sleeping       Past Medical History:  Patient Active Problem List    Diagnosis Date Noted   • Chronic renal insufficiency, stage 3 (moderate) (Formerly [facility]) 02/17/2020   • Diabetic polyneuropathy associated with type 2 diabetes mellitus (Formerly [facility]) 02/12/2020   • Back muscle spasm 02/12/2020   • Asthma due to environmental allergies 02/12/2020   • Other hyperlipidemia 02/12/2020   • Essential hypertension 02/12/2020   • Morbid obesity (Formerly [facility]) 02/12/2020   • Paroxysmal supraventricular tachycardia (Formerly [facility]) 02/12/2020   • Left wrist pain 02/12/2020   • Hearing deficit, left 02/12/2020   • Encounter to establish care with new doctor 02/12/2020   • Dyspnea on exertion 12/27/2019   • Type 2 diabetes mellitus with neurologic complication, with long-term current use of insulin (Formerly [facility]) 12/27/2019       Past Surgical History:  Past Surgical History:   Procedure Laterality Date   • CARPAL TUNNEL ENDOSCOPIC     • CHOLECYSTECTOMY     • OPEN REDUCTION     • PROSTATECTOMY, RADICAL RETRO     • VASECTOMY          Allergies:  Metformin; Penicillins; and Sulfa drugs     Current Medications:    Current Outpatient Medications:   •  insulin NPH (NOVOLIN N) 100 UNIT/ML Suspension, 75-80 units twice a day, Disp: 10 mL, Rfl: 5  •  gabapentin (NEURONTIN) 600 MG tablet, TAKE 1 TABLET BY MOUTH IN THE EARLY AFTERNOON AND 1 TABLET AT BEDTIME, Disp: , Rfl:   •  glipiZIDE (GLUCOTROL) 10 MG Tab, Take 10 mg by mouth., Disp: , Rfl:   •  lisinopril (PRINIVIL) 20 MG Tab, Take 20 mg by mouth., Disp: , Rfl:   •  tramadol (ULTRAM) 50 MG Tab, Take 50 mg by mouth., Disp: , Rfl:   •  baclofen (LIORESAL) 10 MG Tab, Take  by mouth 4 times a day as needed., Disp: ,  Rfl:   •  rosuvastatin (CRESTOR) 20 MG Tab, Take 1 Tab by mouth every evening., Disp: 30 Tab, Rfl: 11  •  aspirin EC (ECOTRIN) 81 MG Tablet Delayed Response, Take 1 Tab by mouth every day., Disp: 90 Tab, Rfl: 3    Social History:  Social History     Socioeconomic History   • Marital status:      Spouse name: Not on file   • Number of children: Not on file   • Years of education: Not on file   • Highest education level: Not on file   Occupational History   • Not on file   Social Needs   • Financial resource strain: Not on file   • Food insecurity     Worry: Not on file     Inability: Not on file   • Transportation needs     Medical: Not on file     Non-medical: Not on file   Tobacco Use   • Smoking status: Never Smoker   • Smokeless tobacco: Never Used   Substance and Sexual Activity   • Alcohol use: Never     Frequency: Never   • Drug use: Never   • Sexual activity: Not on file   Lifestyle   • Physical activity     Days per week: Not on file     Minutes per session: Not on file   • Stress: Not on file   Relationships   • Social connections     Talks on phone: Not on file     Gets together: Not on file     Attends Muslim service: Not on file     Active member of club or organization: Not on file     Attends meetings of clubs or organizations: Not on file     Relationship status: Not on file   • Intimate partner violence     Fear of current or ex partner: Not on file     Emotionally abused: Not on file     Physically abused: Not on file     Forced sexual activity: Not on file   Other Topics Concern   • Not on file   Social History Narrative   • Not on file        Family History:   Family History   Problem Relation Age of Onset   • Alzheimer's Disease Mother    • Diabetes Father    • Heart Disease Father    • No Known Problems Sister    • Diabetes Brother    • Diabetes Sister    • Kidney Disease Sister    • Heart Disease Sister    • Diabetes Brother        PHYSICAL EXAM:   Vital signs: /70 (BP Location:  "Left arm, Patient Position: Sitting, BP Cuff Size: Adult)   Pulse 93   Ht 1.74 m (5' 8.5\")   Wt (!) 141.5 kg (312 lb)   SpO2 94%   BMI 46.75 kg/m²   GENERAL: Morbidly obese male   in no apparent distress.   EYE: No ocular and eyelid asymmetry, Anicteric sclerae,  PERRL, No exophthalmos or lidlag  HENT: Hearing grossly intact, Normocephalic, atraumatic. Pink, moist mucous membranes, No exudate  NECK: Supple. Trachea midline. thyroid is normal in size without nodules or tenderness  CARDIOVASCULAR: Regular rate and rhythm. No murmurs, rubs, or gallops.   LUNGS: Clear to auscultation bilaterally   ABDOMEN: Soft, nontender with positive bowel sounds.   EXTREMITIES: No clubbing, cyanosis, or edema.   NEUROLOGICAL: Cranial nerves II-XII are grossly intact   Symmetric reflexes at the patella no proximal muscle weakness, No visible tremor with both outstretched hands  LYMPH: No cervical, supraclavicular,  adenopathy palpated.   SKIN: No rashes, lesions. Turgor is normal.  There is visible acanthosis  FOOT: Normal sensation to monofilament testing, normal pulses, no ulcers.  Normal Vibration quantitative sensation test.    Labs:  Lab Results   Component Value Date/Time    HBA1C 8.1 (A) 07/21/2020 1250       Lab Results   Component Value Date/Time    WBC 6.5 02/14/2020 08:16 AM    RBC 5.03 02/14/2020 08:16 AM    HEMOGLOBIN 15.5 02/14/2020 08:16 AM    MCV 92.4 02/14/2020 08:16 AM    MCH 30.8 02/14/2020 08:16 AM    MCHC 33.3 (L) 02/14/2020 08:16 AM    RDW 43.9 02/14/2020 08:16 AM    MPV 10.4 02/14/2020 08:16 AM       Lab Results   Component Value Date/Time    SODIUM 136 02/14/2020 08:16 AM    POTASSIUM 3.8 02/14/2020 08:16 AM    CHLORIDE 104 02/14/2020 08:16 AM    CO2 22 02/14/2020 08:16 AM    ANION 10.0 02/14/2020 08:16 AM    GLUCOSE 241 (H) 02/14/2020 08:16 AM    BUN 23 (H) 02/14/2020 08:16 AM    CREATININE 1.29 02/14/2020 08:16 AM    CALCIUM 8.8 02/14/2020 08:16 AM    ASTSGOT 32 02/14/2020 08:16 AM    ALTSGPT 29 " 02/14/2020 08:16 AM    TBILIRUBIN 0.5 02/14/2020 08:16 AM    ALBUMIN 4.0 02/14/2020 08:16 AM    TOTPROTEIN 7.3 02/14/2020 08:16 AM    GLOBULIN 3.3 02/14/2020 08:16 AM    AGRATIO 1.2 02/14/2020 08:16 AM       Lab Results   Component Value Date/Time    CHOLSTRLTOT 100 02/14/2020 0816    TRIGLYCERIDE 111 02/14/2020 0816    HDL 29 (A) 02/14/2020 0816    LDL 49 02/14/2020 0816       Lab Results   Component Value Date/Time    MALBCRT 40 (H) 02/14/2020 08:17 AM    MICROALBUR 6.7 02/14/2020 08:17 AM        Lab Results   Component Value Date/Time    TSHULTRASEN 0.760 02/14/2020 0816     No results found for: FREEDIR  No results found for: FREET3  No results found for: THYSTIMIG      ASSESSMENT/PLAN:     1. Type 2 diabetes mellitus with diabetic polyneuropathy, with long-term current use of insulin (HCC)  Uncontrolled secondary to hyperglycemia  Recommend patient to cut back total carbohydrate intake gradually from 300 g daily to 150 g daily and then less than 100 g daily  Discussed the relationship between excess carbohydrates and weight gain and uncontrolled diabetes  Recommend that he start Ozempic 0.25 mg weekly and titrate after 4 weeks to .5 mg weekly  Reviewed the side effects of GLP-1 analog therapy  Continue glipizide  Continue Novolin R 7 5 units twice a day  Explained to patient that she may need to discontinue glipizide once sugars come down or if he develops hypoglycemia  I am referring him for diabetes education, meal planning and medical nutrition therapy  I recommend regular exercise  We will plan for follow-up in 2 months to review blood sugars because [place] lives very far      2. Other hyperlipidemia  Well-controlled  Continue generic Crestor  Recommend repeating fasting lipids in 12 months    3. Essential hypertension  Uncontrolled  Patient claims that he has whitecoat hypertension  I recommend that he continue lisinopril  I am going to check his blood pressure again next time and if it still elevated I am  going to start him on chlorthalidone or calcium channel blocker    4. Long-term insulin use (HCC)  Patient is on long-term insulin therapy due to severe obesity with type 2 diabetes      Return in about 2 months (around 9/21/2020).       This patient during there office visit was started on new medication.  Side effects of new medications were discussed with the patient today in the office. The patient was supplied paperwork on this new medication.    Thank you kindly for allowing me to participate in the diabetes care plan for this patient.    Gumaro Nielsen MD, FACE, [facility]  07/21/20    CC:   Jorge Giang M.D.

## 2020-07-28 ENCOUNTER — APPOINTMENT (OUTPATIENT)
Dept: HEALTH INFORMATION MANAGEMENT | Facility: MEDICAL CENTER | Age: 70
End: 2020-07-28
Payer: MEDICARE

## 2020-07-29 ENCOUNTER — APPOINTMENT (OUTPATIENT)
Dept: HEALTH INFORMATION MANAGEMENT | Facility: MEDICAL CENTER | Age: 70
End: 2020-07-29
Payer: MEDICARE

## 2020-09-25 ENCOUNTER — OFFICE VISIT (OUTPATIENT)
Dept: ENDOCRINOLOGY | Facility: MEDICAL CENTER | Age: 70
End: 2020-09-25
Attending: INTERNAL MEDICINE
Payer: MEDICARE

## 2020-09-25 VITALS
OXYGEN SATURATION: 97 % | HEART RATE: 92 BPM | SYSTOLIC BLOOD PRESSURE: 140 MMHG | DIASTOLIC BLOOD PRESSURE: 70 MMHG | HEIGHT: 69 IN | BODY MASS INDEX: 45.77 KG/M2 | WEIGHT: 309 LBS

## 2020-09-25 DIAGNOSIS — I10 ESSENTIAL HYPERTENSION: ICD-10-CM

## 2020-09-25 DIAGNOSIS — E11.42 TYPE 2 DIABETES MELLITUS WITH DIABETIC POLYNEUROPATHY, WITH LONG-TERM CURRENT USE OF INSULIN (HCC): ICD-10-CM

## 2020-09-25 DIAGNOSIS — Z79.4 LONG-TERM INSULIN USE (HCC): ICD-10-CM

## 2020-09-25 DIAGNOSIS — Z79.4 TYPE 2 DIABETES MELLITUS WITH DIABETIC POLYNEUROPATHY, WITH LONG-TERM CURRENT USE OF INSULIN (HCC): ICD-10-CM

## 2020-09-25 DIAGNOSIS — E78.49 OTHER HYPERLIPIDEMIA: ICD-10-CM

## 2020-09-25 PROCEDURE — 99212 OFFICE O/P EST SF 10 MIN: CPT | Performed by: INTERNAL MEDICINE

## 2020-09-25 PROCEDURE — 99214 OFFICE O/P EST MOD 30 MIN: CPT | Performed by: INTERNAL MEDICINE

## 2020-09-25 ASSESSMENT — FIBROSIS 4 INDEX: FIB4 SCORE: 2.46

## 2020-09-25 NOTE — PROGRESS NOTES
Monofilament testing with a 10 gram force: sensation: intact bilaterally  Visual Inspection: Feet without maceration, ulcers, or fissures.  Right foot cold to touch.  Pedal pulses: intact bilaterally

## 2020-09-25 NOTE — PROGRESS NOTES
CHIEF COMPLAINT: Patient is here for follow up of Type 2 Diabetes Mellitus    HPI:     Jose Alvarez is a 70 y.o. male with Type 2 Diabetes Mellitus here for follow up.    Labs from 7/21/20 show A1c is elevated at 8.1% which is his baseline    I saw the patient last month as a new consult for uncontrolled diabetes.  He was just on Novolin R 75 units twice a day and glipizide 10 mg daily and had uncontrolled sugars.  I started him on a GLP-1 analog.  He has a history of severe obesity, hyperlipidemia and hypertension      On follow-up he is taking Ozempic 0.5 mg weekly, regular insulin for Novolin R 75 units twice a day, glipizide 10 mg twice a day, he reports that his sugars are much better    He claims that he completed an A1c with his primary care and was down to 6.6% but we do not have the records    He reports less hyperglycemia  He denies hypoglycemia  He reports his fasting sugars are in the 120s to 130s    Regards to his blood pressure he is taking lisinopril 20 mg daily.  He does have some microalbuminuria based on labs from February 2020.  His last serum creatinine was elevated 1.29.  We do not have any recent labs      For his hyperlipidemia he is taking generic Crestor 20 mg daily.  He denies symptoms of statin related myopathy.  LDL cholesterol was at goal at 49 with triglycerides of 111 on February 2020.  Baseline TSH was normal at 0.760        BG Diary:09/25/20  Patient did not bring glucose meter    Weight has been stable    Diabetes Complications   Retinopathy: No known retinopathy.  Last eye exam: August 2020 with Dr. Lara  Neuropathy: Denies paresthesias or numbness in hands or feet. Denies any foot wounds.  Exercise: Minimal.  Diet: Fair.  Patient's medications, allergies, and social histories were reviewed and updated as appropriate.    ROS:     CONS:     No fever, no chills   EYES:     No diplopia, no blurry vision   CV:           No chest pain, no palpitations   PULM:     No SOB, no cough, no  "hemoptysis.   GI:            No nausea, no vomiting, no diarrhea, no constipation   ENDO:     No polyuria, no polydipsia, no heat intolerance, no cold intolerance       Past Medical History:  Problem List:  2020-07: Long-term insulin use (Formerly [facility])  2020-02: Chronic renal insufficiency, stage 3 (moderate) (Formerly [facility])  2020-02: Diabetic polyneuropathy associated with type 2 diabetes   mellitus (Formerly [facility])  2020-02: Back muscle spasm  2020-02: Asthma due to environmental allergies  2020-02: Other hyperlipidemia  2020-02: Essential hypertension  2020-02: Morbid obesity (Formerly [facility])  2020-02: Paroxysmal supraventricular tachycardia (Formerly [facility])  2020-02: Left wrist pain  2020-02: Hearing deficit, left  2020-02: Encounter to establish care with new doctor  2019-12: Dyspnea on exertion  2019-12: Type 2 diabetes mellitus with neurologic complication, with   long-term current use of insulin (Formerly [facility])      Past Surgical History:  Past Surgical History:   Procedure Laterality Date   • CARPAL TUNNEL ENDOSCOPIC     • CHOLECYSTECTOMY     • OPEN REDUCTION     • PROSTATECTOMY, RADICAL RETRO     • VASECTOMY          Allergies:  Metformin, Penicillins, and Sulfa drugs     Social History:  Social History     Tobacco Use   • Smoking status: Never Smoker   • Smokeless tobacco: Never Used   Substance Use Topics   • Alcohol use: Never     Frequency: Never   • Drug use: Never        Family History:   family history includes Alzheimer's Disease in his mother; Diabetes in his brother, brother, father, and sister; Heart Disease in his father and sister; Kidney Disease in his sister; No Known Problems in his sister.      PHYSICAL EXAM:   OBJECTIVE:  Vital signs: /70   Pulse 92   Ht 1.74 m (5' 8.5\")   Wt (!) 140.2 kg (309 lb)   SpO2 97%   BMI 46.30 kg/m²   GENERAL: Well-developed, well-nourished in no apparent distress.   EYE:  No ocular asymmetry, PERRLA  HENT: Pink, moist mucous membranes.    NECK: No thyromegaly.   CARDIOVASCULAR:  No murmurs  LUNGS: Clear breath " sounds  ABDOMEN: Soft, nontender   EXTREMITIES: No clubbing, cyanosis, or edema.   NEUROLOGICAL: No gross focal motor abnormalities   LYMPH: No cervical adenopathy palpated.   SKIN: No rashes, lesions.     Labs:  Lab Results   Component Value Date/Time    HBA1C 8.1 (A) 07/21/2020 12:50 PM        Lab Results   Component Value Date/Time    WBC 6.5 02/14/2020 08:16 AM    RBC 5.03 02/14/2020 08:16 AM    HEMOGLOBIN 15.5 02/14/2020 08:16 AM    MCV 92.4 02/14/2020 08:16 AM    MCH 30.8 02/14/2020 08:16 AM    MCHC 33.3 (L) 02/14/2020 08:16 AM    RDW 43.9 02/14/2020 08:16 AM    MPV 10.4 02/14/2020 08:16 AM       Lab Results   Component Value Date/Time    SODIUM 136 02/14/2020 08:16 AM    POTASSIUM 3.8 02/14/2020 08:16 AM    CHLORIDE 104 02/14/2020 08:16 AM    CO2 22 02/14/2020 08:16 AM    ANION 10.0 02/14/2020 08:16 AM    GLUCOSE 241 (H) 02/14/2020 08:16 AM    BUN 23 (H) 02/14/2020 08:16 AM    CREATININE 1.29 02/14/2020 08:16 AM    CALCIUM 8.8 02/14/2020 08:16 AM    ASTSGOT 32 02/14/2020 08:16 AM    ALTSGPT 29 02/14/2020 08:16 AM    TBILIRUBIN 0.5 02/14/2020 08:16 AM    ALBUMIN 4.0 02/14/2020 08:16 AM    TOTPROTEIN 7.3 02/14/2020 08:16 AM    GLOBULIN 3.3 02/14/2020 08:16 AM    AGRATIO 1.2 02/14/2020 08:16 AM       Lab Results   Component Value Date/Time    CHOLSTRLTOT 100 02/14/2020 0816    TRIGLYCERIDE 111 02/14/2020 0816    HDL 29 (A) 02/14/2020 0816    LDL 49 02/14/2020 0816       Lab Results   Component Value Date/Time    MALBCRT 40 (H) 02/14/2020 08:17 AM    MICROALBUR 6.7 02/14/2020 08:17 AM        Lab Results   Component Value Date/Time    TSHULTRASEN 0.760 02/14/2020 0816     No results found for: FREEDIR  No results found for: FREET3  No results found for: THYSTIMIG        ASSESSMENT/PLAN:     1. Type 2 diabetes mellitus with diabetic polyneuropathy, with long-term current use of insulin (HCC)  Uncontrolled at baseline  Patient reports improved sugars and improved labs done at primary care physician's office  I will  request records  Continue Ozempic 25 mg weekly  Continue Novolin R 75 units twice a day  Continue glipizide  Recommend that he watch his carb intake and exercise regularly  I will see him again in 3 months with repeat of his A1c    2. Other hyperlipidemia  Controlled  Continue current generic Crestor  Repeat fasting lipids in 6 months    3. Essential hypertension  Controlled, continue current medications  I am repeating a urine micral with his next labs    4. Long-term insulin use (HCC)  Patient is on long-term insulin therapy with a GLP-1 and other oral agents for type 2 diabetes management      Return in about 2 months (around 11/25/2020).      Thank you kindly for allowing me to participate in the diabetes care plan for this patient.    Gumaro Nielsen MD, FACE, [facility]  09/25/20    CC:   Chidi Sherman D.O.

## 2020-11-24 ENCOUNTER — HOSPITAL ENCOUNTER (OUTPATIENT)
Dept: LAB | Facility: MEDICAL CENTER | Age: 70
End: 2020-11-24
Attending: INTERNAL MEDICINE
Payer: MEDICARE

## 2020-11-24 DIAGNOSIS — E78.49 OTHER HYPERLIPIDEMIA: ICD-10-CM

## 2020-11-24 DIAGNOSIS — Z79.4 LONG-TERM INSULIN USE (HCC): ICD-10-CM

## 2020-11-24 DIAGNOSIS — E11.42 TYPE 2 DIABETES MELLITUS WITH DIABETIC POLYNEUROPATHY, WITH LONG-TERM CURRENT USE OF INSULIN (HCC): ICD-10-CM

## 2020-11-24 DIAGNOSIS — Z79.4 TYPE 2 DIABETES MELLITUS WITH DIABETIC POLYNEUROPATHY, WITH LONG-TERM CURRENT USE OF INSULIN (HCC): ICD-10-CM

## 2020-11-24 DIAGNOSIS — I10 ESSENTIAL HYPERTENSION: ICD-10-CM

## 2020-11-24 LAB
ALBUMIN SERPL BCP-MCNC: 4.3 G/DL (ref 3.2–4.9)
ALBUMIN/GLOB SERPL: 1.4 G/DL
ALP SERPL-CCNC: 94 U/L (ref 30–99)
ALT SERPL-CCNC: 32 U/L (ref 2–50)
ANION GAP SERPL CALC-SCNC: 13 MMOL/L (ref 7–16)
AST SERPL-CCNC: 29 U/L (ref 12–45)
BILIRUB SERPL-MCNC: 0.5 MG/DL (ref 0.1–1.5)
BUN SERPL-MCNC: 24 MG/DL (ref 8–22)
CALCIUM SERPL-MCNC: 9.6 MG/DL (ref 8.5–10.5)
CHLORIDE SERPL-SCNC: 99 MMOL/L (ref 96–112)
CHOLEST SERPL-MCNC: 109 MG/DL (ref 100–199)
CO2 SERPL-SCNC: 25 MMOL/L (ref 20–33)
CREAT SERPL-MCNC: 1.04 MG/DL (ref 0.5–1.4)
CREAT UR-MCNC: 150.23 MG/DL
EST. AVERAGE GLUCOSE BLD GHB EST-MCNC: 169 MG/DL
FASTING STATUS PATIENT QL REPORTED: NORMAL
GLOBULIN SER CALC-MCNC: 3 G/DL (ref 1.9–3.5)
GLUCOSE SERPL-MCNC: 184 MG/DL (ref 65–99)
HBA1C MFR BLD: 7.5 % (ref 0–5.6)
HDLC SERPL-MCNC: 31 MG/DL
LDLC SERPL CALC-MCNC: 47 MG/DL
MICROALBUMIN UR-MCNC: 9.9 MG/DL
MICROALBUMIN/CREAT UR: 66 MG/G (ref 0–30)
POTASSIUM SERPL-SCNC: 4 MMOL/L (ref 3.6–5.5)
PROT SERPL-MCNC: 7.3 G/DL (ref 6–8.2)
SODIUM SERPL-SCNC: 137 MMOL/L (ref 135–145)
T4 FREE SERPL-MCNC: 0.97 NG/DL (ref 0.93–1.7)
TRIGL SERPL-MCNC: 155 MG/DL (ref 0–149)
TSH SERPL DL<=0.005 MIU/L-ACNC: 1.06 UIU/ML (ref 0.38–5.33)

## 2020-11-24 PROCEDURE — 82570 ASSAY OF URINE CREATININE: CPT

## 2020-11-24 PROCEDURE — 82043 UR ALBUMIN QUANTITATIVE: CPT

## 2020-11-24 PROCEDURE — 84439 ASSAY OF FREE THYROXINE: CPT

## 2020-11-24 PROCEDURE — 83036 HEMOGLOBIN GLYCOSYLATED A1C: CPT | Mod: GA

## 2020-11-24 PROCEDURE — 36415 COLL VENOUS BLD VENIPUNCTURE: CPT

## 2020-11-24 PROCEDURE — 80053 COMPREHEN METABOLIC PANEL: CPT

## 2020-11-24 PROCEDURE — 84443 ASSAY THYROID STIM HORMONE: CPT

## 2020-11-24 PROCEDURE — 80061 LIPID PANEL: CPT

## 2020-12-01 ENCOUNTER — TELEMEDICINE (OUTPATIENT)
Dept: ENDOCRINOLOGY | Facility: MEDICAL CENTER | Age: 70
End: 2020-12-01
Attending: INTERNAL MEDICINE
Payer: MEDICARE

## 2020-12-01 DIAGNOSIS — Z79.4 TYPE 2 DIABETES MELLITUS WITH DIABETIC POLYNEUROPATHY, WITH LONG-TERM CURRENT USE OF INSULIN (HCC): ICD-10-CM

## 2020-12-01 DIAGNOSIS — I10 ESSENTIAL HYPERTENSION: ICD-10-CM

## 2020-12-01 DIAGNOSIS — E78.49 OTHER HYPERLIPIDEMIA: ICD-10-CM

## 2020-12-01 DIAGNOSIS — E11.42 TYPE 2 DIABETES MELLITUS WITH DIABETIC POLYNEUROPATHY, WITH LONG-TERM CURRENT USE OF INSULIN (HCC): ICD-10-CM

## 2020-12-01 DIAGNOSIS — Z79.4 LONG-TERM INSULIN USE (HCC): ICD-10-CM

## 2020-12-01 PROCEDURE — 99214 OFFICE O/P EST MOD 30 MIN: CPT | Mod: 95,CR | Performed by: INTERNAL MEDICINE

## 2020-12-01 RX ORDER — DULAGLUTIDE 0.75 MG/.5ML
1 INJECTION, SOLUTION SUBCUTANEOUS
Qty: 4 EACH | Refills: 11 | Status: SHIPPED | OUTPATIENT
Start: 2020-12-01 | End: 2021-08-02

## 2020-12-01 RX ORDER — FLASH GLUCOSE SENSOR
1 KIT MISCELLANEOUS
Qty: 1 EACH | Refills: 1 | Status: SHIPPED | OUTPATIENT
Start: 2020-12-01 | End: 2021-08-02

## 2020-12-01 NOTE — PROGRESS NOTES
CHIEF COMPLAINT: Patient is here for follow up of Type 2 Diabetes Mellitus. Patient was presented for a telehealth consultation via secure and encrypted videoconferencing technology. This encounter was conducted via Zoom . Verbal consent was obtained. Patient's identity was verified.    HPI:     Jose Alvarez is a 70 y.o. male with Type 2 Diabetes Mellitus here for follow up.    Labs from 11/24/2020 show a1c is better at 7.5%  Labs from 7/21/20 show A1c is elevated at 8.1% which is his baseline    I saw the patient last month as a new consult for uncontrolled diabetes.  He was just on Novolin R 75 units twice a day and glipizide 10 mg daily and had uncontrolled sugars.  I started him on a GLP-1 analog.  He has a history of severe obesity, hyperlipidemia and hypertension      On follow-up he is taking Ozempic 0.5 mg weekly (he stopped this), Regular insulin for Novolin R 75 units twice a day, glipizide 10 mg twice a day, he reports that his sugars are much better    He stopped Ozempic due to GI upset  He reports less hyperglycemia  He denies hypoglycemia  He reports his fasting sugars are in the 120s to 130s    Regards to his blood pressure he is taking lisinopril 20 mg daily.    He does have some microalbuminuria based on labs from February 2020   and Nov 2020      For his hyperlipidemia he is taking generic Crestor 20 mg daily.  He denies symptoms of statin related myopathy.  LDL cholesterol was at goal at 47 with triglycerides of 155 on Nov 2020.  Baseline TSH was normal at 0.760        BG Diary:  Patient did not bring glucose meter    Weight has been stable    Diabetes Complications   Retinopathy: No known retinopathy.  Last eye exam: August 2020 with Dr. Lara  Neuropathy: Denies paresthesias or numbness in hands or feet. Denies any foot wounds.  Exercise: Minimal.  Diet: Fair.  Patient's medications, allergies, and social histories were reviewed and updated as appropriate.    ROS:     CONS:     No fever, no  chills   EYES:     No diplopia, no blurry vision   CV:           No chest pain, no palpitations   PULM:     No SOB, no cough, no hemoptysis.   GI:            No nausea, no vomiting, no diarrhea, no constipation   ENDO:     No polyuria, no polydipsia, no heat intolerance, no cold intolerance       Past Medical History:  Problem List:  2020-07: Long-term insulin use ([facility])  2020-02: Chronic renal insufficiency, stage 3 (moderate)  2020-02: Diabetic polyneuropathy associated with type 2 diabetes   mellitus (HCC)  2020-02: Back muscle spasm  2020-02: Asthma due to environmental allergies  2020-02: Other hyperlipidemia  2020-02: Essential hypertension  2020-02: Morbid obesity ([facility])  2020-02: Paroxysmal supraventricular tachycardia ([facility])  2020-02: Left wrist pain  2020-02: Hearing deficit, left  2020-02: Encounter to establish care with new doctor  2019-12: Dyspnea on exertion  2019-12: Type 2 diabetes mellitus with neurologic complication, with   long-term current use of insulin ([facility])      Past Surgical History:  Past Surgical History:   Procedure Laterality Date   • CARPAL TUNNEL ENDOSCOPIC     • CHOLECYSTECTOMY     • OPEN REDUCTION     • PROSTATECTOMY, RADICAL RETRO     • VASECTOMY          Allergies:  Metformin, Penicillins, and Sulfa drugs     Social History:  Social History     Tobacco Use   • Smoking status: Never Smoker   • Smokeless tobacco: Never Used   Substance Use Topics   • Alcohol use: Never     Frequency: Never   • Drug use: Never        Family History:   family history includes Alzheimer's Disease in his mother; Diabetes in his brother, brother, father, and sister; Heart Disease in his father and sister; Kidney Disease in his sister; No Known Problems in his sister.      PHYSICAL EXAM:   OBJECTIVE:  Vital signs: There were no vitals taken for this visit.  GENERAL: Obese male in no apparent distress.   EYE:  No ocular asymmetry, PERRLA  HENT: Pink, moist mucous membranes.    NECK: No thyromegaly.    CARDIOVASCULAR:  No murmurs  LUNGS: Clear breath sounds  ABDOMEN: Soft, nontender   EXTREMITIES: No clubbing, cyanosis, or edema.   NEUROLOGICAL: No gross focal motor abnormalities   LYMPH: No cervical adenopathy seen.   SKIN: No rashes, lesions.     Labs:  Lab Results   Component Value Date/Time    HBA1C 7.5 (H) 11/24/2020 08:01 AM        Lab Results   Component Value Date/Time    WBC 6.5 02/14/2020 08:16 AM    RBC 5.03 02/14/2020 08:16 AM    HEMOGLOBIN 15.5 02/14/2020 08:16 AM    MCV 92.4 02/14/2020 08:16 AM    MCH 30.8 02/14/2020 08:16 AM    MCHC 33.3 (L) 02/14/2020 08:16 AM    RDW 43.9 02/14/2020 08:16 AM    MPV 10.4 02/14/2020 08:16 AM       Lab Results   Component Value Date/Time    SODIUM 137 11/24/2020 08:01 AM    POTASSIUM 4.0 11/24/2020 08:01 AM    CHLORIDE 99 11/24/2020 08:01 AM    CO2 25 11/24/2020 08:01 AM    ANION 13.0 11/24/2020 08:01 AM    GLUCOSE 184 (H) 11/24/2020 08:01 AM    BUN 24 (H) 11/24/2020 08:01 AM    CREATININE 1.04 11/24/2020 08:01 AM    CALCIUM 9.6 11/24/2020 08:01 AM    ASTSGOT 29 11/24/2020 08:01 AM    ALTSGPT 32 11/24/2020 08:01 AM    TBILIRUBIN 0.5 11/24/2020 08:01 AM    ALBUMIN 4.3 11/24/2020 08:01 AM    TOTPROTEIN 7.3 11/24/2020 08:01 AM    GLOBULIN 3.0 11/24/2020 08:01 AM    AGRATIO 1.4 11/24/2020 08:01 AM       Lab Results   Component Value Date/Time    CHOLSTRLTOT 100 02/14/2020 0816    TRIGLYCERIDE 111 02/14/2020 0816    HDL 29 (A) 02/14/2020 0816    LDL 49 02/14/2020 0816       Lab Results   Component Value Date/Time    MALBCRT 66 (H) 11/24/2020 08:01 AM    MICROALBUR 9.9 11/24/2020 08:01 AM        Lab Results   Component Value Date/Time    TSHULTRASARIC 0.760 02/14/2020 0816     No results found for: FREEDIR  No results found for: FREET3  No results found for: THYSTIMIG        ASSESSMENT/PLAN:     1. Type 2 diabetes mellitus with diabetic polyneuropathy, with long-term current use of insulin (HCC)  Uncontrolled at baseline now improved  A1c is down to 7.5%  Replace Ozempic  with Trulicity 0.75mg weekly  Continue Novolin R 75 units twice a day  Continue Glipizide  I am prescribing him a lillie glucose sensor  Recommend that he watch his carb intake and exercise regularly  I will see him again in 3 months with repeat of his A1c    2. Other hyperlipidemia  Controlled  Continue current generic Crestor  Repeat fasting lipids in 12 months    3. Essential hypertension  Controlled, continue current medications    4. Long-term insulin use (HCC)  Patient is on long-term insulin therapy with a GLP-1 and other oral agents for type 2 diabetes management      Return in about 4 months (around 4/1/2021).      Thank you kindly for allowing me to participate in the diabetes care plan for this patient.    Gumaro Nielsen MD, FACE, NU  09/25/20    CC:   Chidi Sherman D.O.

## 2020-12-02 DIAGNOSIS — E78.49 OTHER HYPERLIPIDEMIA: ICD-10-CM

## 2020-12-02 RX ORDER — ROSUVASTATIN CALCIUM 20 MG/1
20 TABLET, COATED ORAL EVERY EVENING
Qty: 30 TAB | Refills: 0 | Status: SHIPPED | OUTPATIENT
Start: 2020-12-02 | End: 2020-12-31

## 2020-12-30 DIAGNOSIS — E78.49 OTHER HYPERLIPIDEMIA: ICD-10-CM

## 2020-12-31 RX ORDER — ROSUVASTATIN CALCIUM 20 MG/1
TABLET, COATED ORAL
Qty: 30 TAB | Refills: 0 | Status: SHIPPED | OUTPATIENT
Start: 2020-12-31 | End: 2021-08-02

## 2021-08-02 ENCOUNTER — APPOINTMENT (OUTPATIENT)
Dept: RADIOLOGY | Facility: MEDICAL CENTER | Age: 71
End: 2021-08-02
Attending: EMERGENCY MEDICINE
Payer: MEDICARE

## 2021-08-02 ENCOUNTER — HOSPITAL ENCOUNTER (OUTPATIENT)
Facility: MEDICAL CENTER | Age: 71
End: 2021-08-05
Attending: EMERGENCY MEDICINE | Admitting: INTERNAL MEDICINE
Payer: MEDICARE

## 2021-08-02 ENCOUNTER — APPOINTMENT (OUTPATIENT)
Dept: RADIOLOGY | Facility: MEDICAL CENTER | Age: 71
End: 2021-08-02
Payer: MEDICARE

## 2021-08-02 DIAGNOSIS — K76.89 LIVER NODULE: ICD-10-CM

## 2021-08-02 DIAGNOSIS — R16.0 LIVER MASS: ICD-10-CM

## 2021-08-02 DIAGNOSIS — R91.1 PULMONARY NODULE: ICD-10-CM

## 2021-08-02 DIAGNOSIS — R07.9 CHEST PAIN, UNSPECIFIED TYPE: ICD-10-CM

## 2021-08-02 PROBLEM — R06.00 DYSPNEA: Status: ACTIVE | Noted: 2021-08-02

## 2021-08-02 PROBLEM — J18.9 PNEUMONIA DUE TO INFECTIOUS ORGANISM: Status: ACTIVE | Noted: 2021-08-02

## 2021-08-02 PROBLEM — D72.829 LEUKOCYTOSIS: Status: ACTIVE | Noted: 2021-08-02

## 2021-08-02 LAB
ALBUMIN SERPL BCP-MCNC: 3 G/DL (ref 3.2–4.9)
ALBUMIN/GLOB SERPL: 0.7 G/DL
ALP SERPL-CCNC: 203 U/L (ref 30–99)
ALT SERPL-CCNC: 39 U/L (ref 2–50)
ANION GAP SERPL CALC-SCNC: 15 MMOL/L (ref 7–16)
APPEARANCE UR: CLEAR
APTT PPP: 34.2 SEC (ref 24.7–36)
AST SERPL-CCNC: 36 U/L (ref 12–45)
BACTERIA #/AREA URNS HPF: NEGATIVE /HPF
BASOPHILS # BLD AUTO: 0.4 % (ref 0–1.8)
BASOPHILS # BLD: 0.04 K/UL (ref 0–0.12)
BILIRUB SERPL-MCNC: 0.6 MG/DL (ref 0.1–1.5)
BILIRUB UR QL STRIP.AUTO: ABNORMAL
BUN SERPL-MCNC: 12 MG/DL (ref 8–22)
CALCIUM SERPL-MCNC: 8.9 MG/DL (ref 8.5–10.5)
CHLORIDE SERPL-SCNC: 100 MMOL/L (ref 96–112)
CO2 SERPL-SCNC: 23 MMOL/L (ref 20–33)
COLOR UR: ABNORMAL
CREAT SERPL-MCNC: 0.85 MG/DL (ref 0.5–1.4)
D DIMER PPP IA.FEU-MCNC: 1.88 UG/ML (FEU) (ref 0–0.5)
EOSINOPHIL # BLD AUTO: 0.41 K/UL (ref 0–0.51)
EOSINOPHIL NFR BLD: 3.8 % (ref 0–6.9)
EPI CELLS #/AREA URNS HPF: NEGATIVE /HPF
ERYTHROCYTE [DISTWIDTH] IN BLOOD BY AUTOMATED COUNT: 47.6 FL (ref 35.9–50)
FLUAV RNA SPEC QL NAA+PROBE: NEGATIVE
FLUBV RNA SPEC QL NAA+PROBE: NEGATIVE
GLOBULIN SER CALC-MCNC: 4.6 G/DL (ref 1.9–3.5)
GLUCOSE BLD-MCNC: 209 MG/DL (ref 65–99)
GLUCOSE SERPL-MCNC: 210 MG/DL (ref 65–99)
GLUCOSE UR STRIP.AUTO-MCNC: NEGATIVE MG/DL
HCT VFR BLD AUTO: 41.6 % (ref 42–52)
HGB BLD-MCNC: 13.4 G/DL (ref 14–18)
HYALINE CASTS #/AREA URNS LPF: ABNORMAL /LPF
IMM GRANULOCYTES # BLD AUTO: 0.08 K/UL (ref 0–0.11)
IMM GRANULOCYTES NFR BLD AUTO: 0.7 % (ref 0–0.9)
INR PPP: 1.17 (ref 0.87–1.13)
KETONES UR STRIP.AUTO-MCNC: 15 MG/DL
LACTATE BLD-SCNC: 2 MMOL/L (ref 0.5–2)
LEUKOCYTE ESTERASE UR QL STRIP.AUTO: NEGATIVE
LYMPHOCYTES # BLD AUTO: 1.36 K/UL (ref 1–4.8)
LYMPHOCYTES NFR BLD: 12.5 % (ref 22–41)
MCH RBC QN AUTO: 29.3 PG (ref 27–33)
MCHC RBC AUTO-ENTMCNC: 32.2 G/DL (ref 33.7–35.3)
MCV RBC AUTO: 91 FL (ref 81.4–97.8)
MICRO URNS: ABNORMAL
MONOCYTES # BLD AUTO: 0.88 K/UL (ref 0–0.85)
MONOCYTES NFR BLD AUTO: 8.1 % (ref 0–13.4)
NEUTROPHILS # BLD AUTO: 8.13 K/UL (ref 1.82–7.42)
NEUTROPHILS NFR BLD: 74.5 % (ref 44–72)
NITRITE UR QL STRIP.AUTO: NEGATIVE
NRBC # BLD AUTO: 0 K/UL
NRBC BLD-RTO: 0 /100 WBC
NT-PROBNP SERPL IA-MCNC: 297 PG/ML (ref 0–125)
PH UR STRIP.AUTO: 6 [PH] (ref 5–8)
PLATELET # BLD AUTO: 435 K/UL (ref 164–446)
PMV BLD AUTO: 8.8 FL (ref 9–12.9)
POTASSIUM SERPL-SCNC: 3.4 MMOL/L (ref 3.6–5.5)
PROCALCITONIN SERPL-MCNC: 0.5 NG/ML
PROT SERPL-MCNC: 7.6 G/DL (ref 6–8.2)
PROT UR QL STRIP: 100 MG/DL
PROTHROMBIN TIME: 14.6 SEC (ref 12–14.6)
RBC # BLD AUTO: 4.57 M/UL (ref 4.7–6.1)
RBC # URNS HPF: ABNORMAL /HPF
RBC UR QL AUTO: NEGATIVE
RSV RNA SPEC QL NAA+PROBE: NEGATIVE
SARS-COV-2 RNA RESP QL NAA+PROBE: NOTDETECTED
SODIUM SERPL-SCNC: 138 MMOL/L (ref 135–145)
SP GR UR STRIP.AUTO: 1.02
SPECIMEN SOURCE: NORMAL
TROPONIN T SERPL-MCNC: 21 NG/L (ref 6–19)
TROPONIN T SERPL-MCNC: 32 NG/L (ref 6–19)
TROPONIN T SERPL-MCNC: 35 NG/L (ref 6–19)
UROBILINOGEN UR STRIP.AUTO-MCNC: 4 MG/DL
WBC # BLD AUTO: 10.9 K/UL (ref 4.8–10.8)
WBC #/AREA URNS HPF: ABNORMAL /HPF

## 2021-08-02 PROCEDURE — 84484 ASSAY OF TROPONIN QUANT: CPT

## 2021-08-02 PROCEDURE — 96372 THER/PROPH/DIAG INJ SC/IM: CPT

## 2021-08-02 PROCEDURE — 99285 EMERGENCY DEPT VISIT HI MDM: CPT

## 2021-08-02 PROCEDURE — 71045 X-RAY EXAM CHEST 1 VIEW: CPT

## 2021-08-02 PROCEDURE — 96365 THER/PROPH/DIAG IV INF INIT: CPT | Mod: XU

## 2021-08-02 PROCEDURE — 36415 COLL VENOUS BLD VENIPUNCTURE: CPT

## 2021-08-02 PROCEDURE — 87077 CULTURE AEROBIC IDENTIFY: CPT

## 2021-08-02 PROCEDURE — 85379 FIBRIN DEGRADATION QUANT: CPT

## 2021-08-02 PROCEDURE — A9270 NON-COVERED ITEM OR SERVICE: HCPCS | Performed by: EMERGENCY MEDICINE

## 2021-08-02 PROCEDURE — 93005 ELECTROCARDIOGRAM TRACING: CPT

## 2021-08-02 PROCEDURE — 93005 ELECTROCARDIOGRAM TRACING: CPT | Performed by: EMERGENCY MEDICINE

## 2021-08-02 PROCEDURE — G0378 HOSPITAL OBSERVATION PER HR: HCPCS

## 2021-08-02 PROCEDURE — C9803 HOPD COVID-19 SPEC COLLECT: HCPCS | Performed by: EMERGENCY MEDICINE

## 2021-08-02 PROCEDURE — 83880 ASSAY OF NATRIURETIC PEPTIDE: CPT

## 2021-08-02 PROCEDURE — 87150 DNA/RNA AMPLIFIED PROBE: CPT

## 2021-08-02 PROCEDURE — 700111 HCHG RX REV CODE 636 W/ 250 OVERRIDE (IP): Performed by: INTERNAL MEDICINE

## 2021-08-02 PROCEDURE — 96375 TX/PRO/DX INJ NEW DRUG ADDON: CPT

## 2021-08-02 PROCEDURE — 700102 HCHG RX REV CODE 250 W/ 637 OVERRIDE(OP): Performed by: EMERGENCY MEDICINE

## 2021-08-02 PROCEDURE — 84145 PROCALCITONIN (PCT): CPT

## 2021-08-02 PROCEDURE — 81001 URINALYSIS AUTO W/SCOPE: CPT

## 2021-08-02 PROCEDURE — 85610 PROTHROMBIN TIME: CPT

## 2021-08-02 PROCEDURE — 87040 BLOOD CULTURE FOR BACTERIA: CPT

## 2021-08-02 PROCEDURE — 94760 N-INVAS EAR/PLS OXIMETRY 1: CPT

## 2021-08-02 PROCEDURE — 85730 THROMBOPLASTIN TIME PARTIAL: CPT

## 2021-08-02 PROCEDURE — 85025 COMPLETE CBC W/AUTO DIFF WBC: CPT

## 2021-08-02 PROCEDURE — 99220 PR INITIAL OBSERVATION CARE,LEVL III: CPT | Performed by: INTERNAL MEDICINE

## 2021-08-02 PROCEDURE — 71275 CT ANGIOGRAPHY CHEST: CPT | Mod: ME

## 2021-08-02 PROCEDURE — 0241U HCHG SARS-COV-2 COVID-19 NFCT DS RESP RNA 4 TRGT MIC: CPT

## 2021-08-02 PROCEDURE — 82962 GLUCOSE BLOOD TEST: CPT

## 2021-08-02 PROCEDURE — 700102 HCHG RX REV CODE 250 W/ 637 OVERRIDE(OP): Performed by: INTERNAL MEDICINE

## 2021-08-02 PROCEDURE — 83605 ASSAY OF LACTIC ACID: CPT

## 2021-08-02 PROCEDURE — A9270 NON-COVERED ITEM OR SERVICE: HCPCS | Performed by: INTERNAL MEDICINE

## 2021-08-02 PROCEDURE — 94660 CPAP INITIATION&MGMT: CPT

## 2021-08-02 PROCEDURE — 80053 COMPREHEN METABOLIC PANEL: CPT

## 2021-08-02 RX ORDER — ACETAMINOPHEN 500 MG
1000 TABLET ORAL EVERY 6 HOURS PRN
Status: ON HOLD | COMMUNITY
End: 2021-08-30

## 2021-08-02 RX ORDER — LABETALOL HYDROCHLORIDE 5 MG/ML
10 INJECTION, SOLUTION INTRAVENOUS EVERY 4 HOURS PRN
Status: DISCONTINUED | OUTPATIENT
Start: 2021-08-02 | End: 2021-08-05 | Stop reason: HOSPADM

## 2021-08-02 RX ORDER — IBUPROFEN 200 MG
600 TABLET ORAL EVERY EVENING
Status: ON HOLD | COMMUNITY
End: 2021-08-30

## 2021-08-02 RX ORDER — INSULIN LISPRO 100 [IU]/ML
3-14 INJECTION, SOLUTION INTRAVENOUS; SUBCUTANEOUS
Status: DISCONTINUED | OUTPATIENT
Start: 2021-08-02 | End: 2021-08-05 | Stop reason: HOSPADM

## 2021-08-02 RX ORDER — INSULIN LISPRO 100 [IU]/ML
0.2 INJECTION, SOLUTION INTRAVENOUS; SUBCUTANEOUS
Status: DISCONTINUED | OUTPATIENT
Start: 2021-08-02 | End: 2021-08-05 | Stop reason: HOSPADM

## 2021-08-02 RX ORDER — ROSUVASTATIN CALCIUM 20 MG/1
20 TABLET, COATED ORAL EVERY EVENING
COMMUNITY

## 2021-08-02 RX ORDER — GABAPENTIN 300 MG/1
600 CAPSULE ORAL 2 TIMES DAILY
Status: DISCONTINUED | OUTPATIENT
Start: 2021-08-02 | End: 2021-08-05 | Stop reason: HOSPADM

## 2021-08-02 RX ORDER — ACETAMINOPHEN 500 MG
1000 TABLET ORAL EVERY EVENING
Status: DISCONTINUED | OUTPATIENT
Start: 2021-08-02 | End: 2021-08-05 | Stop reason: HOSPADM

## 2021-08-02 RX ORDER — BISACODYL 10 MG
10 SUPPOSITORY, RECTAL RECTAL
Status: DISCONTINUED | OUTPATIENT
Start: 2021-08-02 | End: 2021-08-05 | Stop reason: HOSPADM

## 2021-08-02 RX ORDER — ROSUVASTATIN CALCIUM 20 MG/1
20 TABLET, COATED ORAL EVERY EVENING
Status: DISCONTINUED | OUTPATIENT
Start: 2021-08-02 | End: 2021-08-05 | Stop reason: HOSPADM

## 2021-08-02 RX ORDER — DEXTROSE MONOHYDRATE 25 G/50ML
50 INJECTION, SOLUTION INTRAVENOUS
Status: DISCONTINUED | OUTPATIENT
Start: 2021-08-02 | End: 2021-08-02

## 2021-08-02 RX ORDER — LISINOPRIL 20 MG/1
40 TABLET ORAL
Status: DISCONTINUED | OUTPATIENT
Start: 2021-08-02 | End: 2021-08-05 | Stop reason: HOSPADM

## 2021-08-02 RX ORDER — ASPIRIN 81 MG/1
324 TABLET, CHEWABLE ORAL ONCE
Status: COMPLETED | OUTPATIENT
Start: 2021-08-02 | End: 2021-08-02

## 2021-08-02 RX ORDER — DEXTROSE MONOHYDRATE 25 G/50ML
50 INJECTION, SOLUTION INTRAVENOUS
Status: DISCONTINUED | OUTPATIENT
Start: 2021-08-02 | End: 2021-08-05 | Stop reason: HOSPADM

## 2021-08-02 RX ORDER — SODIUM CHLORIDE, SODIUM LACTATE, POTASSIUM CHLORIDE, AND CALCIUM CHLORIDE .6; .31; .03; .02 G/100ML; G/100ML; G/100ML; G/100ML
500 INJECTION, SOLUTION INTRAVENOUS
Status: DISCONTINUED | OUTPATIENT
Start: 2021-08-02 | End: 2021-08-05 | Stop reason: HOSPADM

## 2021-08-02 RX ORDER — LISINOPRIL 20 MG/1
20 TABLET ORAL 2 TIMES DAILY
Status: DISCONTINUED | OUTPATIENT
Start: 2021-08-02 | End: 2021-08-02

## 2021-08-02 RX ORDER — AZITHROMYCIN 500 MG/5ML
500 INJECTION, POWDER, LYOPHILIZED, FOR SOLUTION INTRAVENOUS EVERY 24 HOURS
Status: COMPLETED | OUTPATIENT
Start: 2021-08-02 | End: 2021-08-05

## 2021-08-02 RX ORDER — GLIPIZIDE 5 MG/1
10 TABLET ORAL
Status: DISCONTINUED | OUTPATIENT
Start: 2021-08-03 | End: 2021-08-05 | Stop reason: HOSPADM

## 2021-08-02 RX ORDER — AMOXICILLIN 250 MG
2 CAPSULE ORAL 2 TIMES DAILY
Status: DISCONTINUED | OUTPATIENT
Start: 2021-08-02 | End: 2021-08-05 | Stop reason: HOSPADM

## 2021-08-02 RX ORDER — GLIPIZIDE 10 MG/1
10 TABLET ORAL 3 TIMES DAILY
Status: DISCONTINUED | OUTPATIENT
Start: 2021-08-02 | End: 2021-08-02

## 2021-08-02 RX ORDER — POLYETHYLENE GLYCOL 3350 17 G/17G
1 POWDER, FOR SOLUTION ORAL
Status: DISCONTINUED | OUTPATIENT
Start: 2021-08-02 | End: 2021-08-05 | Stop reason: HOSPADM

## 2021-08-02 RX ORDER — LACTOBACILLUS RHAMNOSUS GG 10B CELL
1 CAPSULE ORAL
Status: DISCONTINUED | OUTPATIENT
Start: 2021-08-03 | End: 2021-08-05 | Stop reason: HOSPADM

## 2021-08-02 RX ORDER — ACETAMINOPHEN 325 MG/1
650 TABLET ORAL EVERY 6 HOURS PRN
Status: DISCONTINUED | OUTPATIENT
Start: 2021-08-02 | End: 2021-08-05 | Stop reason: HOSPADM

## 2021-08-02 RX ORDER — IPRATROPIUM BROMIDE AND ALBUTEROL SULFATE 2.5; .5 MG/3ML; MG/3ML
3 SOLUTION RESPIRATORY (INHALATION)
Status: DISCONTINUED | OUTPATIENT
Start: 2021-08-02 | End: 2021-08-05 | Stop reason: HOSPADM

## 2021-08-02 RX ADMIN — ACETAMINOPHEN 1000 MG: 500 TABLET, FILM COATED ORAL at 20:09

## 2021-08-02 RX ADMIN — LISINOPRIL 40 MG: 20 TABLET ORAL at 20:09

## 2021-08-02 RX ADMIN — AZITHROMYCIN MONOHYDRATE 500 MG: 500 INJECTION, POWDER, LYOPHILIZED, FOR SOLUTION INTRAVENOUS at 20:08

## 2021-08-02 RX ADMIN — CEFTRIAXONE SODIUM 2 G: 10 INJECTION, POWDER, FOR SOLUTION INTRAVENOUS at 20:08

## 2021-08-02 RX ADMIN — ASPIRIN 324 MG: 81 TABLET, CHEWABLE ORAL at 14:52

## 2021-08-02 RX ADMIN — ROSUVASTATIN CALCIUM 20 MG: 20 TABLET, FILM COATED ORAL at 20:09

## 2021-08-02 RX ADMIN — INSULIN GLARGINE 25 UNITS: 100 INJECTION, SOLUTION SUBCUTANEOUS at 20:18

## 2021-08-02 RX ADMIN — GABAPENTIN 600 MG: 300 CAPSULE ORAL at 20:09

## 2021-08-02 ASSESSMENT — COGNITIVE AND FUNCTIONAL STATUS - GENERAL
DAILY ACTIVITIY SCORE: 24
MOBILITY SCORE: 24
SUGGESTED CMS G CODE MODIFIER MOBILITY: CH
SUGGESTED CMS G CODE MODIFIER DAILY ACTIVITY: CH

## 2021-08-02 ASSESSMENT — LIFESTYLE VARIABLES
AVERAGE NUMBER OF DAYS PER WEEK YOU HAVE A DRINK CONTAINING ALCOHOL: 0
TOTAL SCORE: 0
HAVE YOU EVER FELT YOU SHOULD CUT DOWN ON YOUR DRINKING: NO
HOW MANY TIMES IN THE PAST YEAR HAVE YOU HAD 5 OR MORE DRINKS IN A DAY: 0
HAVE PEOPLE ANNOYED YOU BY CRITICIZING YOUR DRINKING: NO
EVER HAD A DRINK FIRST THING IN THE MORNING TO STEADY YOUR NERVES TO GET RID OF A HANGOVER: NO
ALCOHOL_USE: NO
ON A TYPICAL DAY WHEN YOU DRINK ALCOHOL HOW MANY DRINKS DO YOU HAVE: 0
EVER FELT BAD OR GUILTY ABOUT YOUR DRINKING: NO
CONSUMPTION TOTAL: NEGATIVE
TOTAL SCORE: 0
DOES PATIENT WANT TO STOP DRINKING: NO
TOTAL SCORE: 0

## 2021-08-02 ASSESSMENT — ENCOUNTER SYMPTOMS
SEIZURES: 0
ABDOMINAL PAIN: 0
HEMOPTYSIS: 0
TREMORS: 0
CHILLS: 1
EYE REDNESS: 0
WHEEZING: 0
VOMITING: 0
EYE PAIN: 0
MYALGIAS: 0
DIARRHEA: 0
HEADACHES: 0
WEAKNESS: 0
DIZZINESS: 0
NERVOUS/ANXIOUS: 0
SHORTNESS OF BREATH: 1
PALPITATIONS: 0
FOCAL WEAKNESS: 0
BLOOD IN STOOL: 0
FALLS: 0
COUGH: 1
LOSS OF CONSCIOUSNESS: 0
CONSTIPATION: 0
INSOMNIA: 0
NAUSEA: 0
FEVER: 1

## 2021-08-02 ASSESSMENT — FIBROSIS 4 INDEX
FIB4 SCORE: 2.16
FIB4 SCORE: 0.91

## 2021-08-02 ASSESSMENT — PATIENT HEALTH QUESTIONNAIRE - PHQ9
1. LITTLE INTEREST OR PLEASURE IN DOING THINGS: NOT AT ALL
2. FEELING DOWN, DEPRESSED, IRRITABLE, OR HOPELESS: NOT AT ALL
SUM OF ALL RESPONSES TO PHQ9 QUESTIONS 1 AND 2: 0

## 2021-08-02 NOTE — ASSESSMENT & PLAN NOTE
In the setting of pneumonia and recent preprocedural sedation  Patient is requiring 4 L of oxygen via nasal cannula  RT protocol echo ordered

## 2021-08-02 NOTE — ASSESSMENT & PLAN NOTE
Uncontrolled  PRN IV antihypertensives  Continue current home meds and their doses,adjust doses depending on response.

## 2021-08-02 NOTE — ED NOTES
Med Rec complete per pt and pt's wife  Allergies Reviewed  No ABX in the last 14 days    Verified with Pt's pharmacy RX directions for  GLIPIZIDE, 10 mg three times daily  NOVOLIN R, 75 units twice daily  LISINOPRIL, 20 mg twice daily  Pt is taking these medications as prescribed

## 2021-08-02 NOTE — H&P
Hospital Medicine History & Physical Note    Date of Service  8/2/2021    Primary Care Physician  Chidi Sherman D.O.    Consultants  pulmonary    Specialist Names: Dr. Moss, Pulmonology to review CTA Chest if biopsy can be done bronchoscopically versus CT guided.    Code Status  No Order    Chief Complaint  Chief Complaint   Patient presents with   • Chest Pain   • Shortness of Breath   • Loss of Appetite       History of Presenting Illness  Jose Alvarez is a 71 y.o. male who presented 8/2/2021 with Chest Pain, Shortness of Breath, and Loss of Appetite  He is obese. He has a history of asthma, LUCIO on CPAP, diabetes, hypertension. For the past few days he has developed severe dyspnea on exertion and sometimes at rest. When asked about chest pain he strongly clarifies that he does not have any chest pain, leg swelling or palpitations. He also has sweats, subjective fevers and chills along with a dry cough. No hemoptysis or phlegm. He occasionally smokes cigars. 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 He had no family history of lung cancer. He is CoVID vaccinated.  At the ED,he is afebrile, slightly hypertensive. Not hypoxic.  CTA Chest showed no PE but showed pleural based nodules, pulmonary nodules and a hepatic lesion.  Mild leukocytosis. Hyperglcyemia, Cr- baseline.    I discussed the plan of care with patient, family, bedside RN and pulmonary.    Review of Systems  Review of Systems   Constitutional: Positive for chills and fever.   HENT: Negative for congestion, hearing loss and nosebleeds.    Eyes: Negative for pain and redness.   Respiratory: Positive for cough and shortness of breath. Negative for hemoptysis and wheezing.    Cardiovascular: Negative for chest pain and palpitations.   Gastrointestinal: Negative for abdominal pain, blood in stool, constipation, diarrhea, nausea and vomiting.   Genitourinary: Negative for dysuria, frequency  and hematuria.   Musculoskeletal: Negative for falls, joint pain and myalgias.   Skin: Negative for rash.   Neurological: Negative for dizziness, tremors, focal weakness, seizures, loss of consciousness, weakness and headaches.   Psychiatric/Behavioral: The patient is not nervous/anxious and does not have insomnia.    All other systems reviewed and are negative.      Past Medical History   has a past medical history of Asthma, Diabetes (HCC), and Sleep apnea.    Surgical History   has a past surgical history that includes open reduction; vasectomy; prostatectomy, radical retro; cholecystectomy; and carpal tunnel endoscopic.     Family History  family history includes Alzheimer's Disease in his mother; Diabetes in his brother, brother, father, and sister; Heart Disease in his father and sister; Kidney Disease in his sister; No Known Problems in his sister.   Family history reviewed with patient. There is no family history that is pertinent to the chief complaint.     Social History   reports that he has never smoked. He has never used smokeless tobacco. He reports that he does not drink alcohol and does not use drugs.    Allergies  Allergies   Allergen Reactions   • Metformin      diarrhea   • Penicillins    • Sulfa Drugs        Medications  Prior to Admission Medications   Prescriptions Last Dose Informant Patient Reported? Taking?   Continuous Blood Gluc Sensor (FREESTYLE JOSE 2 SENSOR SYSTM) Misc   No No   Si Each every 14 days.   Dulaglutide (TRULICITY) 0.75 MG/0.5ML Solution Pen-injector   No No   Sig: Inject 1 PEN under the skin every 7 days.   aspirin EC (ECOTRIN) 81 MG Tablet Delayed Response   No No   Sig: Take 1 Tab by mouth every day.   baclofen (LIORESAL) 10 MG Tab   Yes No   Sig: baclofen 10 mg tablet   gabapentin (NEURONTIN) 600 MG tablet   Yes No   Sig: TAKE 1 TABLET BY MOUTH IN THE EARLY AFTERNOON AND 1 TABLET AT BEDTIME   glipiZIDE (GLUCOTROL) 10 MG Tab   Yes No   Sig: Take 10 mg by mouth 2  times a day.   insulin regular (NOVOLIN R) 100 Unit/mL Solution   Yes No   Sig: Novolin R Regular U-100 Insulin 100 unit/mL injection solution   insulin regular human (HUMULIN/NOVOLIN R)   Yes No   Sig: Inject 75 Units as instructed 2 times a day.   lisinopril (PRINIVIL) 20 MG Tab   Yes No   Sig: Take 20 mg by mouth.   rosuvastatin (CRESTOR) 20 MG Tab   No No   Sig: TAKE 1 TABLET BY MOUTH ONCE DAILY IN THE EVENING NEED  TO  BE  SEEN  FOR  REFILL   simvastatin (ZOCOR) 80 MG tablet   Yes No   Sig: simvastatin 80 mg tablet   traMADol (ULTRAM) 50 MG Tab   Yes No   Sig: tramadol 50 mg tablet      Facility-Administered Medications: None       Physical Exam  Temp:  [37.1 °C (98.8 °F)] 37.1 °C (98.8 °F)  Pulse:  [84-98] 84  Resp:  [16-18] 18  BP: (143-163)/(72-77) 163/77  SpO2:  [93 %-96 %] 96 %    Physical Exam  Vitals and nursing note reviewed.   Constitutional:       Appearance: He is obese.   HENT:      Head: Normocephalic and atraumatic.      Right Ear: External ear normal.      Left Ear: External ear normal.      Nose: Nose normal.      Mouth/Throat:      Mouth: Mucous membranes are moist.   Eyes:      General: No scleral icterus.     Conjunctiva/sclera: Conjunctivae normal.   Cardiovascular:      Rate and Rhythm: Normal rate and regular rhythm.      Heart sounds: No murmur heard.   No friction rub. No gallop.    Pulmonary:      Effort: Pulmonary effort is normal.      Breath sounds: Normal breath sounds.   Abdominal:      General: Abdomen is flat. Bowel sounds are normal. There is no distension.      Palpations: Abdomen is soft.      Tenderness: There is no abdominal tenderness. There is no guarding.   Musculoskeletal:         General: Normal range of motion.      Cervical back: Normal range of motion and neck supple.   Skin:     General: Skin is warm.   Neurological:      Mental Status: He is alert and oriented to person, place, and time. Mental status is at baseline.   Psychiatric:         Mood and Affect: Mood  normal.         Behavior: Behavior normal.         Thought Content: Thought content normal.         Judgment: Judgment normal.         Laboratory:  Recent Labs     08/02/21  1200   WBC 10.9*   RBC 4.57*   HEMOGLOBIN 13.4*   HEMATOCRIT 41.6*   MCV 91.0   MCH 29.3   MCHC 32.2*   RDW 47.6   PLATELETCT 435   MPV 8.8*     Recent Labs     08/02/21  1200   SODIUM 138   POTASSIUM 3.4*   CHLORIDE 100   CO2 23   GLUCOSE 210*   BUN 12   CREATININE 0.85   CALCIUM 8.9     Recent Labs     08/02/21  1200   ALTSGPT 39   ASTSGOT 36   ALKPHOSPHAT 203*   TBILIRUBIN 0.6   GLUCOSE 210*         Recent Labs     08/02/21  1225   NTPROBNP 297*         Recent Labs     08/02/21  1225   TROPONINT 32*       Imaging:  CT-CTA CHEST PULMONARY ARTERY W/ RECONS   Final Result      1.  Limited exam secondary to respiratory motion. No pulmonary embolus is identified.      2.  Multiple pleural-based nodular opacities and bilateral subcentimeter pulmonary nodules. Findings are suspicious for metastatic disease.      3.  Mediastinal and hilar adenopathy, suspicious for metastatic disease.      4.  Hypodense hepatic lesion is poorly characterize, but concerning for malignancy.      5.  Additional adenopathy in the upper abdomen is concerning for malignant metastatic disease as well.            DX-CHEST-PORTABLE (1 VIEW)   Final Result      No evidence of acute cardiopulmonary process.        EKG looks sinus    Assessment/Plan:  I anticipate this patient is appropriate for observation status at this time.    * Dyspnea, multiple lung nodules  Assessment & Plan  Not hypoxic.  Telemetry, trend troponins, baby aspirin, ordered lipid panel, echo ordered   Address pulmonary issues below  UPDATE: I spoke with Dr. Moss who prefers CT guided biopsy  I ordered the CT guided biopsy of pleural based nodule on the left lung  NPO after MN  A PET CT scan and MRI brain also can be ordered afterwards but this can be done outpatient.            Pneumonia due to  infectious organism  Assessment & Plan  UPDATE:  Procalcitonin came back elevated  Started antibiotics.    Leukocytosis  Assessment & Plan  Ordered procalcitonin. If elevated please start antibiotics for pneumonia.      Chronic renal insufficiency, stage 3 (moderate) ([facility])- (present on admission)  Assessment & Plan  Stable    Morbid obesity ([facility])- (present on admission)  Assessment & Plan  When better, healthy diet, lifestyle, exercise and weight loss  Defer bariatric referral to PCP    Essential hypertension- (present on admission)  Assessment & Plan  Uncontrolled  PRN IV antihypertensives  Continue current home meds and their doses,adjust doses depending on response.    Asthma due to environmental allergies- (present on admission)  Assessment & Plan  Currently not exacerbatingPRN duonebs    Type 2 diabetes mellitus with neurologic complication, with long-term current use of insulin ([facility])- (present on admission)  Assessment & Plan  Uncontrolled  For now will do current regimen  Tend BGs and adjust insulin regimen depending on results  Diabetes education consulted.      VTE prophylaxis: enoxaparin ppx

## 2021-08-02 NOTE — ED TRIAGE NOTES
Vitals:    08/02/21 1114   BP: 153/72   Pulse: 98   Resp: 16   Temp: 37.1 °C (98.8 °F)   SpO2: 96%     Chief Complaint   Patient presents with   • Chest Pain   • Shortness of Breath   • Loss of Appetite     Pt is c/o of chest pain, SOB, loss of appetite in the last week, generally feeling ill, and weakness. He was evaluated for a cough a couple weeks ago and was dx with possible muscle tears between his ribs. He is concerned for possible pneumonia. Pt is tachypneic in triage but otherwise appears comfortable. Pt is fully vaccinated for covid-19.    Pt placed in lobby and educated on waiting room process. Apologized for wait time.

## 2021-08-02 NOTE — ED PROVIDER NOTES
"ED Provider Note    Scribed for Cuco Reveles M.D. by Hawa Rod. 8/2/2021, 12:13 PM.    Primary care provider: Chidi Sherman D.O.  Means of arrival: Wheel chair  History obtained from: Patient, daughter  History limited by: None    CHIEF COMPLAINT  Chief Complaint   Patient presents with   • Chest Pain   • Shortness of Breath   • Loss of Appetite       HPI  Jose Alvarez is a 71 y.o. male, with a history of asthma, diabetes, and dyslipidemia, who presents to the Emergency Department accompanied by his daughter, for worsening pleuritic chest pain with an onset of 2 weeks ago. Patient describes his chest pain as achy in quality without any radiation. He states his chest pain is not \"heart related,\" but comes on predominately when he coughs. Exacerbating factors include taking deep breaths, and coughing. No alleviating factors were identified. Daughter adds the patient has been having difficulty eating and drinking secondary to the pain. He reports associated sweats, chills, subjective fever, dyspnea on exertion, and cough. He denies any associated leg swelling/pain. He denies ever smoking cigarettes. Patient was vaccinated against COVID-19 with the Bill and Bill vaccine. He denies any history of blood clots.     REVIEW OF SYSTEMS  Pertinent positives include pleuritic chest pain, sweats, chills, subjective fever, dyspnea on exertion, and cough. Pertinent negatives include no leg swelling/pain.  All other systems reviewed and negative.    PAST MEDICAL HISTORY   has a past medical history of Asthma, Diabetes (HCC), and Sleep apnea.    SURGICAL HISTORY   has a past surgical history that includes open reduction; vasectomy; prostatectomy, radical retro; cholecystectomy; and carpal tunnel endoscopic.    SOCIAL HISTORY  Social History     Tobacco Use   • Smoking status: Never Smoker   • Smokeless tobacco: Never Used   Vaping Use   • Vaping Use: Never used   Substance Use Topics   • Alcohol use: Never   • Drug use: " "Never      Social History     Substance and Sexual Activity   Drug Use Never       FAMILY HISTORY  Family History   Problem Relation Age of Onset   • Alzheimer's Disease Mother    • Diabetes Father    • Heart Disease Father    • No Known Problems Sister    • Diabetes Brother    • Diabetes Sister    • Kidney Disease Sister    • Heart Disease Sister    • Diabetes Brother        CURRENT MEDICATIONS  Home Medications    **Home medications have not yet been reviewed for this encounter**         ALLERGIES  Allergies   Allergen Reactions   • Metformin      diarrhea   • Penicillins    • Sulfa Drugs        PHYSICAL EXAM  VITAL SIGNS: /72   Pulse 98   Temp 37.1 °C (98.8 °F) (Temporal)   Resp 16   Ht 1.702 m (5' 7\")   Wt 123 kg (270 lb 11.6 oz)   SpO2 96%   BMI 42.40 kg/m²     Constitutional: Well developed, Well nourished, No acute distress, Non-toxic appearance.   HENT: Normocephalic, Atraumatic, mucous membranes moist, no erythema, exudates, swelling, or masses, nares patent  Eyes: nonicteric  Neck: Supple, no meningismus  Lymphatic: No lymphadenopathy noted.   Cardiovascular: Regular rate and rhythm, no gallops rubs or murmurs  Lungs: Clear bilaterally   Abdomen: Bowel sounds normal, Soft, No tenderness  Skin: Warm, Dry, no rash  Genitalia: Deferred  Rectal: Deferred  Extremities: No edema  Neurologic: Alert, appropriate, follows commands, moving all extremities, normal speech   Psychiatric: Affect normal    DIAGNOSTIC STUDIES / PROCEDURES    LABS  Results for orders placed or performed during the hospital encounter of 08/02/21   CBC with Differential   Result Value Ref Range    WBC 10.9 (H) 4.8 - 10.8 K/uL    RBC 4.57 (L) 4.70 - 6.10 M/uL    Hemoglobin 13.4 (L) 14.0 - 18.0 g/dL    Hematocrit 41.6 (L) 42.0 - 52.0 %    MCV 91.0 81.4 - 97.8 fL    MCH 29.3 27.0 - 33.0 pg    MCHC 32.2 (L) 33.7 - 35.3 g/dL    RDW 47.6 35.9 - 50.0 fL    Platelet Count 435 164 - 446 K/uL    MPV 8.8 (L) 9.0 - 12.9 fL    " Neutrophils-Polys 74.50 (H) 44.00 - 72.00 %    Lymphocytes 12.50 (L) 22.00 - 41.00 %    Monocytes 8.10 0.00 - 13.40 %    Eosinophils 3.80 0.00 - 6.90 %    Basophils 0.40 0.00 - 1.80 %    Immature Granulocytes 0.70 0.00 - 0.90 %    Nucleated RBC 0.00 /100 WBC    Neutrophils (Absolute) 8.13 (H) 1.82 - 7.42 K/uL    Lymphs (Absolute) 1.36 1.00 - 4.80 K/uL    Monos (Absolute) 0.88 (H) 0.00 - 0.85 K/uL    Eos (Absolute) 0.41 0.00 - 0.51 K/uL    Baso (Absolute) 0.04 0.00 - 0.12 K/uL    Immature Granulocytes (abs) 0.08 0.00 - 0.11 K/uL    NRBC (Absolute) 0.00 K/uL   Comp Metabolic Panel   Result Value Ref Range    Sodium 138 135 - 145 mmol/L    Potassium 3.4 (L) 3.6 - 5.5 mmol/L    Chloride 100 96 - 112 mmol/L    Co2 23 20 - 33 mmol/L    Anion Gap 15.0 7.0 - 16.0    Glucose 210 (H) 65 - 99 mg/dL    Bun 12 8 - 22 mg/dL    Creatinine 0.85 0.50 - 1.40 mg/dL    Calcium 8.9 8.5 - 10.5 mg/dL    AST(SGOT) 36 12 - 45 U/L    ALT(SGPT) 39 2 - 50 U/L    Alkaline Phosphatase 203 (H) 30 - 99 U/L    Total Bilirubin 0.6 0.1 - 1.5 mg/dL    Albumin 3.0 (L) 3.2 - 4.9 g/dL    Total Protein 7.6 6.0 - 8.2 g/dL    Globulin 4.6 (H) 1.9 - 3.5 g/dL    A-G Ratio 0.7 g/dL   TROPONIN   Result Value Ref Range    Troponin T 32 (H) 6 - 19 ng/L   D-DIMER   Result Value Ref Range    D-Dimer Screen 1.88 (H) 0.00 - 0.50 ug/mL (FEU)   COV-2, FLU A/B, AND RSV BY PCR (2-4 HOURS CEPHEID): Collect NP swab in VTM    Specimen: Respirate   Result Value Ref Range    Influenza virus A RNA Negative Negative    Influenza virus B, PCR Negative Negative    RSV, PCR Negative Negative    SARS-CoV-2 by PCR NotDetected     SARS-CoV-2 Source NP Swab    proBrain Natriuretic Peptide, NT   Result Value Ref Range    NT-proBNP 297 (H) 0 - 125 pg/mL   ESTIMATED GFR   Result Value Ref Range    GFR If African American >60 >60 mL/min/1.73 m 2    GFR If Non African American >60 >60 mL/min/1.73 m 2   EKG   Result Value Ref Range    Report       [facility] Emergency  Dept.    Test Date:  2021  Pt Name:    PATIENCE NEIL                    Department: ER  MRN:        [id number]                      Room:  Gender:     Male                         Technician: [id number]  :        1950                   Requested By:ER TRIAGE PROTOCOL  Order #:    [id number]                    Reading MD:    Measurements  Intervals                                Axis  Rate:       103                          P:          -6  TX:         152                          QRS:        22  QRSD:       80                           T:          47  QT:         336  QTc:        440    Interpretive Statements  SINUS TACHYCARDIA  MULTIPLE PREMATURE COMPLEXES, VENT & SUPRAVEN  BORDERLINE T WAVE ABNORMALITIES  Compared to ECG 2019 11:36:43  Sinus rhythm no longer present  Atrial premature complex(es) no longer present  T-wave abnormality still present       All labs reviewed by me.    EKG  Obtained at 11:17 AM  Sinus tachycardia  Rate 103  Axis normal   Intervals normal   T wave flattening but no ST segment elevation or depression.   Several PAC's and one PVC     RADIOLOGY  CT-CTA CHEST PULMONARY ARTERY W/ RECONS   Final Result      1.  Limited exam secondary to respiratory motion. No pulmonary embolus is identified.      2.  Multiple pleural-based nodular opacities and bilateral subcentimeter pulmonary nodules. Findings are suspicious for metastatic disease.      3.  Mediastinal and hilar adenopathy, suspicious for metastatic disease.      4.  Hypodense hepatic lesion is poorly characterize, but concerning for malignancy.      5.  Additional adenopathy in the upper abdomen is concerning for malignant metastatic disease as well.            DX-CHEST-PORTABLE (1 VIEW)   Final Result      No evidence of acute cardiopulmonary process.        The radiologist's interpretation of all radiological studies have been reviewed by me.    COURSE & MEDICAL DECISION MAKING  Nursing notes, VS, PMSFHx reviewed in chart.      Obtained and reviewed past medical records indicated he has a history of diabetes, dyslipidemia, obesity. He was seen several weeks ago for a cough. He was diagnosed with left sided rib pain at that time and may have pulled a muscle.    12:13 PM Patient seen and examined at bedside. Discussed plan of care with patient. I informed them that labs and imaging will be ordered to evaluate symptoms. Patient is understanding and agreeable with plan.  Ordered for EKG, CMP, CBC with diff, Troponin, D-Dimer, ProBrain Natriuretic Peptide, COV-2 Flu A/B, and DX chest to evaluate.     2:25 PM - Patient was reevaluated at bedside. He is resting comfortably without any new complaints. Discussed lab and radiology results with the patient as outlined above. He is understanding and agreeable with hospitalization.      2:30 PM - Paged hospitalist.     I discussed the patient's case and the above findings with Dr. Boswell (Hospitalist) who will evaluate the patient for hospitalization.     Heart Score: 4    Decision Making:  This is a 71 y.o. year old male who presents with subacute chest discomfort associated with intermittent diaphoresis and shortness of breath.  Work-up here today demonstrates no definite ischemic change on EKG-there is some T wave flattening.  The patient's troponin is mildly elevated.  CT chest demonstrates no PE although he does have multiple pulmonary nodules as well as lymphadenopathy, suggesting the possibility of metastatic disease.  Patient has a heart score 4-he will be admitted for chest pain rule out of acute coronary syndrome.    DISPOSITION:  Patient will be hospitalized by Dr. Boswell in guarded condition.      FINAL IMPRESSION  1. Chest pain, unspecified type    2. Pulmonary nodule    3. Liver nodule          Hawa SALAS ([place]), am scribing for, and in the presence of, Cuco Reveles M.D..    Electronically signed by: Hawa Santiago), 8/2/2021    Cuco SALAS M.D. personally performed the  services described in this documentation, as scribed by Hawa Rod in my presence, and it is both accurate and complete.    C    The note accurately reflects work and decisions made by me.  Cuco Reveles M.D.  8/2/2021  2:32 PM

## 2021-08-02 NOTE — ASSESSMENT & PLAN NOTE
Uncontrolled  For now will do current regimen  Tend BGs and adjust insulin regimen depending on results  Diabetes education consulted.

## 2021-08-03 ENCOUNTER — APPOINTMENT (OUTPATIENT)
Dept: RADIOLOGY | Facility: MEDICAL CENTER | Age: 71
End: 2021-08-03
Attending: NURSE PRACTITIONER
Payer: MEDICARE

## 2021-08-03 LAB
ANION GAP SERPL CALC-SCNC: 12 MMOL/L (ref 7–16)
BUN SERPL-MCNC: 13 MG/DL (ref 8–22)
CALCIUM SERPL-MCNC: 8.8 MG/DL (ref 8.5–10.5)
CHLORIDE SERPL-SCNC: 100 MMOL/L (ref 96–112)
CHOLEST SERPL-MCNC: 132 MG/DL (ref 100–199)
CO2 SERPL-SCNC: 24 MMOL/L (ref 20–33)
CREAT SERPL-MCNC: 0.73 MG/DL (ref 0.5–1.4)
EKG IMPRESSION: NORMAL
ERYTHROCYTE [DISTWIDTH] IN BLOOD BY AUTOMATED COUNT: 48.6 FL (ref 35.9–50)
GLUCOSE BLD-MCNC: 105 MG/DL (ref 65–99)
GLUCOSE BLD-MCNC: 168 MG/DL (ref 65–99)
GLUCOSE BLD-MCNC: 198 MG/DL (ref 65–99)
GLUCOSE BLD-MCNC: 205 MG/DL (ref 65–99)
GLUCOSE SERPL-MCNC: 193 MG/DL (ref 65–99)
HCT VFR BLD AUTO: 40.3 % (ref 42–52)
HDLC SERPL-MCNC: 20 MG/DL
HGB BLD-MCNC: 12.7 G/DL (ref 14–18)
LDLC SERPL CALC-MCNC: 82 MG/DL
MCH RBC QN AUTO: 29 PG (ref 27–33)
MCHC RBC AUTO-ENTMCNC: 31.5 G/DL (ref 33.7–35.3)
MCV RBC AUTO: 92 FL (ref 81.4–97.8)
PLATELET # BLD AUTO: 405 K/UL (ref 164–446)
PMV BLD AUTO: 9.1 FL (ref 9–12.9)
POTASSIUM SERPL-SCNC: 2.9 MMOL/L (ref 3.6–5.5)
RBC # BLD AUTO: 4.38 M/UL (ref 4.7–6.1)
SODIUM SERPL-SCNC: 136 MMOL/L (ref 135–145)
TRIGL SERPL-MCNC: 149 MG/DL (ref 0–149)
TROPONIN T SERPL-MCNC: 33 NG/L (ref 6–19)
WBC # BLD AUTO: 10.9 K/UL (ref 4.8–10.8)

## 2021-08-03 PROCEDURE — 94762 N-INVAS EAR/PLS OXIMTRY CONT: CPT | Mod: XU

## 2021-08-03 PROCEDURE — A9270 NON-COVERED ITEM OR SERVICE: HCPCS | Performed by: INTERNAL MEDICINE

## 2021-08-03 PROCEDURE — 94660 CPAP INITIATION&MGMT: CPT

## 2021-08-03 PROCEDURE — 85027 COMPLETE CBC AUTOMATED: CPT

## 2021-08-03 PROCEDURE — 700102 HCHG RX REV CODE 250 W/ 637 OVERRIDE(OP): Performed by: INTERNAL MEDICINE

## 2021-08-03 PROCEDURE — 99225 PR SUBSEQUENT OBSERVATION CARE,LEVEL II: CPT | Performed by: INTERNAL MEDICINE

## 2021-08-03 PROCEDURE — 80061 LIPID PANEL: CPT

## 2021-08-03 PROCEDURE — 93010 ELECTROCARDIOGRAM REPORT: CPT | Performed by: INTERNAL MEDICINE

## 2021-08-03 PROCEDURE — 84484 ASSAY OF TROPONIN QUANT: CPT

## 2021-08-03 PROCEDURE — 700117 HCHG RX CONTRAST REV CODE 255: Performed by: NURSE PRACTITIONER

## 2021-08-03 PROCEDURE — 74177 CT ABD & PELVIS W/CONTRAST: CPT | Mod: ME

## 2021-08-03 PROCEDURE — 82962 GLUCOSE BLOOD TEST: CPT | Mod: 91

## 2021-08-03 PROCEDURE — A9270 NON-COVERED ITEM OR SERVICE: HCPCS | Performed by: STUDENT IN AN ORGANIZED HEALTH CARE EDUCATION/TRAINING PROGRAM

## 2021-08-03 PROCEDURE — 700102 HCHG RX REV CODE 250 W/ 637 OVERRIDE(OP): Performed by: STUDENT IN AN ORGANIZED HEALTH CARE EDUCATION/TRAINING PROGRAM

## 2021-08-03 PROCEDURE — 96372 THER/PROPH/DIAG INJ SC/IM: CPT

## 2021-08-03 PROCEDURE — 94760 N-INVAS EAR/PLS OXIMETRY 1: CPT

## 2021-08-03 PROCEDURE — 36415 COLL VENOUS BLD VENIPUNCTURE: CPT

## 2021-08-03 PROCEDURE — 80048 BASIC METABOLIC PNL TOTAL CA: CPT

## 2021-08-03 PROCEDURE — G0378 HOSPITAL OBSERVATION PER HR: HCPCS

## 2021-08-03 RX ORDER — POTASSIUM CHLORIDE 20 MEQ/1
40 TABLET, EXTENDED RELEASE ORAL ONCE
Status: COMPLETED | OUTPATIENT
Start: 2021-08-03 | End: 2021-08-03

## 2021-08-03 RX ADMIN — ACETAMINOPHEN 1000 MG: 500 TABLET, FILM COATED ORAL at 17:49

## 2021-08-03 RX ADMIN — Medication 1 CAPSULE: at 10:02

## 2021-08-03 RX ADMIN — ROSUVASTATIN CALCIUM 20 MG: 20 TABLET, FILM COATED ORAL at 17:37

## 2021-08-03 RX ADMIN — INSULIN LISPRO 8 UNITS: 100 INJECTION, SOLUTION INTRAVENOUS; SUBCUTANEOUS at 12:46

## 2021-08-03 RX ADMIN — INSULIN LISPRO 8 UNITS: 100 INJECTION, SOLUTION INTRAVENOUS; SUBCUTANEOUS at 17:24

## 2021-08-03 RX ADMIN — INSULIN LISPRO 3 UNITS: 100 INJECTION, SOLUTION INTRAVENOUS; SUBCUTANEOUS at 12:45

## 2021-08-03 RX ADMIN — GABAPENTIN 600 MG: 300 CAPSULE ORAL at 17:36

## 2021-08-03 RX ADMIN — GABAPENTIN 600 MG: 300 CAPSULE ORAL at 05:32

## 2021-08-03 RX ADMIN — POTASSIUM CHLORIDE 40 MEQ: 1500 TABLET, EXTENDED RELEASE ORAL at 05:32

## 2021-08-03 RX ADMIN — INSULIN GLARGINE 25 UNITS: 100 INJECTION, SOLUTION SUBCUTANEOUS at 17:22

## 2021-08-03 RX ADMIN — INSULIN LISPRO 4 UNITS: 100 INJECTION, SOLUTION INTRAVENOUS; SUBCUTANEOUS at 17:20

## 2021-08-03 RX ADMIN — IOHEXOL 100 ML: 350 INJECTION, SOLUTION INTRAVENOUS at 09:24

## 2021-08-03 RX ADMIN — GLIPIZIDE 10 MG: 10 TABLET ORAL at 17:36

## 2021-08-03 RX ADMIN — POTASSIUM CHLORIDE 40 MEQ: 1500 TABLET, EXTENDED RELEASE ORAL at 10:02

## 2021-08-03 RX ADMIN — LISINOPRIL 40 MG: 20 TABLET ORAL at 05:33

## 2021-08-03 ASSESSMENT — COPD QUESTIONNAIRES
COPD SCREENING SCORE: 2
HAVE YOU SMOKED AT LEAST 100 CIGARETTES IN YOUR ENTIRE LIFE: NO/DON'T KNOW
IN THE PAST 12 MONTHS DO YOU DO LESS THAN YOU USED TO BECAUSE OF YOUR BREATHING PROBLEMS: DISAGREE/UNSURE
DURING THE PAST 4 WEEKS HOW MUCH DID YOU FEEL SHORT OF BREATH: NONE/LITTLE OF THE TIME
DO YOU EVER COUGH UP ANY MUCUS OR PHLEGM?: NO/ONLY WITH OCCASIONAL COLDS OR INFECTIONS

## 2021-08-03 ASSESSMENT — PAIN DESCRIPTION - PAIN TYPE: TYPE: ACUTE PAIN

## 2021-08-03 NOTE — DIETARY
"Nutrition services: Day 0 of admit.  Jose Alvarez is a 71 y.o. male with admitting DX of dyspnea w/ multiple lung nodules, PNA.     Consult received for malnutrition admit screen score 5 for >34 lb wt loss in <3 months with poor PO intake.    Pt reports began eating smaller portions to try to lose weight and manage A1c. However, ~2 months ago pt began to feel generally poor: poor appetite, food wouldn't \"sit right\", food didn't taste good. Pt describes PO intake 1/8th of normal for 2 months. Pt states lost wt more quickly than intended, from  lbs to 270 lbs in ~2 months (severe weight loss 14.3%). Per pt, no PO intake x 5 days prior to admit; states hungry now.      Assessment:  Height: 170.2 cm (5' 7\")  Weight: 123 kg (270 lb 4.5 oz) - via stand up scale  Body mass index is 42.33 kg/m²., BMI classification: Obesity class III. Weight loss counseling not appropriate in acute care setting. RECOMMEND - If appropriate at DC please refer to outpatient nutrition services for weight management.     Diet/Intake: NPO pending lung biopsy    Evaluation:   1. PMHx includes: asthma, T2DM, sleep apnea, chronic renal insufficiency, HTN.  Labs: K+ 2.9, Glu 193, triglycerides 149, HDL 20. 11/2020: A1c 7.5% (trending down over 2020). Pt states A1c 6.8% since 11/2020 lab.  Meds: abx, glucotrol, SSI, semglee, cultrelle. Completed: Kdur. Pt reports no DM meds since last Thursday 97/29).  No overt signs of fat and muscle wasting  Last BM: 8/2  Diabetes education consult received and completed. See education tab in EMR.    Malnutrition Risk: Severe acute malnutrition in the context of acute illness related to poor appetite of unknown etiology as evidenced by pt report PO intake <50% for 2 months and severe wt loss 14.3% in 2 months by pt report.    Recommendations/Plan:  1. Diet advancement per MD following biopsy today.   2. Document intake of all meals as % taken in ADL's to provide interdisciplinary communication across all " shifts.   3. Monitor weight.    RD following.

## 2021-08-03 NOTE — CONSULTS
"Patient seen and examined. Agree with full note by PREMA Higgins.        Radiology Consult  Author: GAGANDEEP Vital Date & Time created: 8/3/2021  8:27 AM   Date of admission  8/2/2021  Note to reader: this note follows the APSO format rather than the historical SOAP format. Assessment and plan located at the top of the note for ease of use.    Chief Complaint  71 y.o. male admitted 8/2/2021 with SOB    HPI  Jose Alvarez is a 71 y.o. male, with a history of asthma, diabetes, and dyslipidemia, who presents to the Emergency Department accompanied by his daughter, for worsening pleuritic chest pain with an onset of 2 weeks ago. Patient describes his chest pain as achy in quality without any radiation. He states his chest pain is not \"heart related,\" but comes on predominately when he coughs. Exacerbating factors include taking deep breaths, and coughing. No alleviating factors were identified. Daughter adds the patient has been having difficulty eating and drinking secondary to the pain. He reports associated sweats, chills, subjective fever, dyspnea on exertion, and cough.    Assessment/Plan  Interval History   Principal Problem:    Dyspnea, multiple lung nodules  Active Problems:    Type 2 diabetes mellitus with neurologic complication, with long-term current use of insulin (HCC)    Asthma due to environmental allergies    Essential hypertension    Morbid obesity (HCC)    Chronic renal insufficiency, stage 3 (moderate) (HCC)    Leukocytosis    Pneumonia due to infectious organism      Plan IR  - Liver mass BX scheduled for 8/4  - NPO at midnight  - hold blood thiners/ ASA   - Went over the risks, benefits, and alternatives of aforementioned procedures were discussed with patient in detail before proceeding.  Patient was given opportunities to ask questions and discuss other options.  Risks including but not limited to perforation, infection, bleeding, missed lesion(s), possible need for surgery(ies) and/or " Pt. to the ED BIBA from [place] for syncope episode. 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 No head or physical trauma noted- Pt. states he was drinking alcohol this evening- Hx. of Diabetes-- Pt. reports being + COVID in March interventional radiology, possible need for repeat procedure(s) and/or additional testing, hospitalization possibly prolonged, cardiac and/or  aspiration, hypoxia, medication (s) and/or sedation reaction(s), indefinite diagnosis, discomfort/pain, unsuccessful and/or incomplete procedure, ineffective therapy, persistent symptoms, damage to adjacent organs/structure and/or vascular, and other adverse event(s) possibly life-threatening.  Interactive discussion was undertaken with Layman's terms.  I answered questions in full and to satisfaction.  Patient stated understanding and acceptance of these risks, and wished to proceed.      [id number]  IR:            Review of Systems  Physical Exam   ROS   Review of Systems   Constitutional: Positive for chills and fever.   HENT: Negative for congestion, hearing loss and nosebleeds.    Eyes: Negative for pain and redness.   Respiratory: Positive for cough and shortness of breath. Negative for hemoptysis and wheezing.    Cardiovascular: Negative for chest pain and palpitations.   Gastrointestinal: Negative for abdominal pain, blood in stool, constipation, diarrhea, nausea and vomiting.   Genitourinary: Negative for dysuria, frequency and hematuria.   Musculoskeletal: Negative for falls, joint pain and myalgias.   Skin: Negative for rash.   Neurological: Negative for dizziness, tremors, focal weakness, seizures, loss of consciousness, weakness and headaches.   Psychiatric/Behavioral: The patient is not nervous/anxious and does not have insomnia.    All other systems reviewed and are negative.  Vitals:    08/03/21 0818   BP: 110/61   Pulse: 83   Resp: 18   Temp: 37.4 °C (99.4 °F)   SpO2: 91%      Physical Exam  Constitutional:       Appearance: Normal appearance.   HENT:      Head: Normocephalic.      Nose: Nose normal.      Mouth/Throat:      Mouth: Mucous membranes are moist.   Eyes:      Pupils: Pupils are equal, round, and reactive to light.   Cardiovascular:      Rate and  Rhythm: Normal rate.   Pulmonary:      Effort: Pulmonary effort is normal. No respiratory distress.   Abdominal:      General: Abdomen is flat.      Tenderness: There is no abdominal tenderness.   Musculoskeletal:         General: No tenderness or deformity.      Cervical back: Normal range of motion.   Skin:     General: Skin is warm and dry.      Capillary Refill: Capillary refill takes less than 2 seconds.      Coloration: Skin is not jaundiced or pale.   Neurological:      General: No focal deficit present.      Mental Status: He is alert.      Motor: No weakness.   Psychiatric:         Mood and Affect: Mood normal.         Behavior: Behavior normal.             Labs    Recent Labs     08/02/21  1200 08/03/21  0329   WBC 10.9* 10.9*   RBC 4.57* 4.38*   HEMOGLOBIN 13.4* 12.7*   HEMATOCRIT 41.6* 40.3*   MCV 91.0 92.0   MCH 29.3 29.0   MCHC 32.2* 31.5*   RDW 47.6 48.6   PLATELETCT 435 405   MPV 8.8* 9.1     Recent Labs     08/02/21  1200 08/03/21  0329   SODIUM 138 136   POTASSIUM 3.4* 2.9*   CHLORIDE 100 100   CO2 23 24   GLUCOSE 210* 193*   BUN 12 13   CREATININE 0.85 0.73   CALCIUM 8.9 8.8     CT-CTA CHEST PULMONARY ARTERY W/ RECONS   Final Result      1.  Limited exam secondary to respiratory motion. No pulmonary embolus is identified.      2.  Multiple pleural-based nodular opacities and bilateral subcentimeter pulmonary nodules. Findings are suspicious for metastatic disease.      3.  Mediastinal and hilar adenopathy, suspicious for metastatic disease.      4.  Hypodense hepatic lesion is poorly characterize, but concerning for malignancy.      5.  Additional adenopathy in the upper abdomen is concerning for malignant metastatic disease as well.            DX-CHEST-PORTABLE (1 VIEW)   Final Result      No evidence of acute cardiopulmonary process.      EC-ECHOCARDIOGRAM COMPLETE W/O CONT    (Results Pending)   CT-BIOPSY-LUNG/MEDIASTINUM W/GUIDE LEFT    (Results Pending)   CT-ABDOMEN-PELVIS WITH & W/O     (Results Pending)     Recent Labs     08/02/21  1200 08/03/21  0329   SODIUM 138 136   POTASSIUM 3.4* 2.9*   CHLORIDE 100 100   CO2 23 24   GLUCOSE 210* 193*   BUN 12 13     INR   Date Value Ref Range Status   08/02/2021 1.17 (H) 0.87 - 1.13 Final     Comment:     INR - Non-therapeutic Reference Range: 0.87-1.13  INR - Therapeutic Reference Range: 2.0-4.0       No results found for: POCINR   No intake or output data in the 24 hours ending 08/03/21 0827   Labs not explicitly included in this progress note were reviewed by the author. Radiology/imaging not explicitly included in this progress note was reviewed by the author.     Past Medical History:   Diagnosis Date   • Asthma    • Diabetes (HCC)    • Sleep apnea         Home Medications    Medication Sig Taking? Last Dose Authorizing Provider   rosuvastatin (CRESTOR) 20 MG Tab Take 20 mg by mouth every evening. Yes > 1 WEEK at [facility]   acetaminophen (TYLENOL) 500 MG Tab Take 1,000 mg by mouth every evening. Yes 8/1/2021 at [facility]   ibuprofen (MOTRIN) 200 MG Tab Take 600 mg by mouth every evening. Yes 8/1/2021 at [facility]   Continuous Blood Gluc Sensor (FREESTYLE JOSE 2 SENSOR SYSTM) Misc 1 Each every 14 days.   Gumaro Nielsen M.D.   insulin regular human (HUMULIN/NOVOLIN R) Inject 75 Units under the skin 2 times a day. 75 units, TWICE DAILY  > 1 WEEK at [facility]   gabapentin (NEURONTIN) 600 MG tablet Take 600 mg by mouth 2 times a day.  > 1 WEEK at [facility]   glipiZIDE (GLUCOTROL) 10 MG Tab Take 10 mg by mouth in the morning, at noon, and at bedtime. Three times daily  > 1 WEEK at [facility]   lisinopril (PRINIVIL) 20 MG Tab Take 20 mg by mouth 2 times a day. TWICE DAILY  > 1 WEEK at [facility]   aspirin EC (ECOTRIN) 81 MG Tablet Delayed Response Take 1 Tab by mouth every day.  > 1 WEEK at [facility] Jason Kelley M.D.       I have performed a physical exam  and reviewed and updated ROS and Plan today (8/3/2021).     25 minutes in directly providing and coordinating care and extensive data review.  No time overlap and excludes procedures.

## 2021-08-03 NOTE — RESPIRATORY CARE
" COPD EDUCATION by COPD CLINICAL EDUCATOR  8/3/2021 at 4:04 PM by Katherin Merrill RRT     Patient reviewed by COPD education team. Patient does not qualify for the COPD program.    COPD Screen  COPD Risk Screening  Do you have a history of COPD?: No (Pt states he has hx of childhood asthma)  Do you have a Pulmonologist?: No (has not had issues with asthma since 18 yrs old)  COPD Population Screener  During the past 4 weeks, how much did you feel short of breath?: None/Little of the time  Do you ever cough up any mucus or phlegm?: No/only with occasional colds or infections  In the past 12 months, you do less than you used to because of your breathing problems: Disagree/unsure  Have you smoked at least 100 cigarettes in your entire life?: No/don't know (never smoked)  How old are you?: 60+  COPD Screening Score: 2  COPD Coordinator Not Recommended: Yes    COPD Assessment  COPD Clinical Specialists ONLY  COPD Education Initiated: No--Quick Screen (Pt states he has hx of childhood asthma, but has not required medication for it since he was 18 yrs old. Never smoked.)  Is this a COPD exacerbation patient?: No  $ Demo/Eval of SVN's, MDI's and Aerosols:  (no respiratory medications)    Meds to Beds        MY COPD ACTION PLAN - No respiratory medications     It is recommended that patients and physicians /healthcare providers complete this action plan together. This plan should be discussed at each physician visit and updated as needed.    The green, yellow and red zones show groups of symptoms of COPD. This list of symptoms is not comprehensive, and you may experience other symptoms. In the \"Actions\" column, your healthcare provider has recommended actions for you to take based on your symptoms.    Patient Name: Jose Alvarez   YOB: 1950   Last Updated on:     Green Zone:  I am doing well today Actions   •  Usual activitiy and exercise level •  Take daily medications   •  Usual amounts of cough and phlegm/mucus " "•  Use oxygen as prescribed   •  Sleep well at night •  Continue regular exercise/diet plan   •  Appetite is good •  At all times avoid cigarette smoke, inhaled irritants     Daily Medications (these medications are taken every day):                Yellow Zone:  I am having a bad day or a COPD flare Actions   •  More breathless than usual •  Continue daily medications   •  I have less energy for my daily activities •  Use quick relief inhaler as ordered   •  Increased or thicker phlegm/mucus •  Use oxygen as prescribed   •  Using quick relief inhaler/nebulizer more often •  Get plenty of rest   •  Swelling of ankles more than usual •  Use pursed lip breathing   •  More coughing than usual •  At all times avoid cigarette smoke, inhaled irritants   •  I feel like I have a \"chest cold\"   •  Poor sleep and my symptoms woke me up   •  My appetite is not good   •  My medicine is not helping    •  Call provider immediately if symptoms don’t improve     Continue daily medications, add rescue medications:               Medications to be used during a flare up, (as Discussed with Provider):              Red Zone:  I need urgent medical care Actions   •  Severe shortness of breath even at rest •  Call 911 or seek medical care immediately   •  Not able to do any activity because of breathing    •  Fever or shaking chills    •  Feeling confused or very drowsy     •  Chest pains    •  Coughing up blood              "

## 2021-08-03 NOTE — RESPIRATORY CARE
Nocturnal Oximetry    Setup Time: 11 45 pm    CPAP auto O2 0%       RN Notified: yes      Study End Time:

## 2021-08-03 NOTE — ASSESSMENT & PLAN NOTE
Patient has been started on IV Ceftriaxone and azithromycin  pro calcitonin was elevated  RT protocol  Continue supplemental oxygen, wean as tolerated  Follow blood cultures

## 2021-08-03 NOTE — CARE PLAN
The patient is Stable - Low risk of patient condition declining or worsening    Shift Goals  Clinical Goals: NPO for lung biopsy  Patient Goals: rest    Progress made toward(s) clinical / shift goals:    Problem: Knowledge Deficit - Standard  Goal: Patient and family/care givers will demonstrate understanding of plan of care, disease process/condition, diagnostic tests and medications  Outcome: Progressing     Problem: Hemodynamics  Goal: Patient's hemodynamics, fluid balance and neurologic status will be stable or improve  Outcome: Progressing     Problem: Fluid Volume  Goal: Fluid volume balance will be maintained  Outcome: Progressing     Problem: Urinary - Renal Perfusion  Goal: Ability to achieve and maintain adequate renal perfusion and functioning will improve  Outcome: Progressing     Problem: Respiratory  Goal: Patient will achieve/maintain optimum respiratory ventilation and gas exchange  Outcome: Progressing     Problem: Mechanical Ventilation  Goal: Safe management of artificial airway and ventilation  Outcome: Progressing  Goal: Successful weaning off mechanical ventilator, spontaneously maintains adequate gas exchange  Outcome: Progressing  Goal: Patient will be able to express needs and understand communication  Outcome: Progressing     Problem: Physical Regulation  Goal: Diagnostic test results will improve  Outcome: Progressing  Goal: Signs and symptoms of infection will decrease  Outcome: Progressing     Problem: Care Map:  Optimal Outcome for the Pneumonia Patient  Goal: Collection and monitoring of appropriate tests and labs  Outcome: Progressing     Problem: Risk for Aspiration  Goal: Patient's risk for aspiration will be absent or decrease  Outcome: Progressing     Problem: Hemodynamics - Pneumonia  Goal: Patient's hemodynamics, fluid balance and neurologic status will be stable or improve  Outcome: Progressing     Problem: Self Care  Goal: Patient will have the ability to perform ADLs  independently or with assistance (bathe, groom, dress, toilet and feed)  Outcome: Progressing     Problem: Discharge Planning - Pneumonia  Goal: Patient will verbalize understanding of lifestyle changes and therapeutic needs post discharge  Outcome: Progressing       Patient is not progressing towards the following goals:

## 2021-08-03 NOTE — CARE PLAN
Problem: Nutritional:  Goal: Achieve adequate nutritional intake  Description: Patient will consume >50% of meals during admit  Outcome: Not Progressing   NPO for procedure.

## 2021-08-04 ENCOUNTER — APPOINTMENT (OUTPATIENT)
Dept: CARDIOLOGY | Facility: MEDICAL CENTER | Age: 71
End: 2021-08-04
Attending: INTERNAL MEDICINE
Payer: MEDICARE

## 2021-08-04 ENCOUNTER — APPOINTMENT (OUTPATIENT)
Dept: RADIOLOGY | Facility: MEDICAL CENTER | Age: 71
End: 2021-08-04
Attending: RADIOLOGY
Payer: MEDICARE

## 2021-08-04 ENCOUNTER — APPOINTMENT (OUTPATIENT)
Dept: RADIOLOGY | Facility: MEDICAL CENTER | Age: 71
End: 2021-08-04
Attending: INTERNAL MEDICINE
Payer: MEDICARE

## 2021-08-04 ENCOUNTER — PATIENT OUTREACH (OUTPATIENT)
Dept: HEALTH INFORMATION MANAGEMENT | Facility: OTHER | Age: 71
End: 2021-08-04

## 2021-08-04 PROBLEM — J96.01 ACUTE RESPIRATORY FAILURE WITH HYPOXIA (HCC): Status: ACTIVE | Noted: 2021-08-02

## 2021-08-04 PROBLEM — R16.0 LIVER MASS: Status: ACTIVE | Noted: 2021-08-04

## 2021-08-04 LAB
ANION GAP SERPL CALC-SCNC: 12 MMOL/L (ref 7–16)
BASOPHILS # BLD AUTO: 0.4 % (ref 0–1.8)
BASOPHILS # BLD: 0.05 K/UL (ref 0–0.12)
BUN SERPL-MCNC: 15 MG/DL (ref 8–22)
CALCIUM SERPL-MCNC: 8.9 MG/DL (ref 8.5–10.5)
CHLORIDE SERPL-SCNC: 99 MMOL/L (ref 96–112)
CO2 SERPL-SCNC: 24 MMOL/L (ref 20–33)
CREAT SERPL-MCNC: 0.94 MG/DL (ref 0.5–1.4)
EOSINOPHIL # BLD AUTO: 1.11 K/UL (ref 0–0.51)
EOSINOPHIL NFR BLD: 9.4 % (ref 0–6.9)
ERYTHROCYTE [DISTWIDTH] IN BLOOD BY AUTOMATED COUNT: 49.7 FL (ref 35.9–50)
GLUCOSE BLD-MCNC: 139 MG/DL (ref 65–99)
GLUCOSE BLD-MCNC: 173 MG/DL (ref 65–99)
GLUCOSE BLD-MCNC: 183 MG/DL (ref 65–99)
GLUCOSE BLD-MCNC: 184 MG/DL (ref 65–99)
GLUCOSE SERPL-MCNC: 119 MG/DL (ref 65–99)
HCT VFR BLD AUTO: 41.7 % (ref 42–52)
HGB BLD-MCNC: 13.2 G/DL (ref 14–18)
IMM GRANULOCYTES # BLD AUTO: 0.07 K/UL (ref 0–0.11)
IMM GRANULOCYTES NFR BLD AUTO: 0.6 % (ref 0–0.9)
LV EJECT FRACT  99904: 65
LV EJECT FRACT MOD 2C 99903: 48.11
LV EJECT FRACT MOD 4C 99902: 60.2
LV EJECT FRACT MOD BP 99901: 54.82
LYMPHOCYTES # BLD AUTO: 1.79 K/UL (ref 1–4.8)
LYMPHOCYTES NFR BLD: 15.2 % (ref 22–41)
MCH RBC QN AUTO: 29.5 PG (ref 27–33)
MCHC RBC AUTO-ENTMCNC: 31.7 G/DL (ref 33.7–35.3)
MCV RBC AUTO: 93.1 FL (ref 81.4–97.8)
MONOCYTES # BLD AUTO: 1.11 K/UL (ref 0–0.85)
MONOCYTES NFR BLD AUTO: 9.4 % (ref 0–13.4)
NEUTROPHILS # BLD AUTO: 7.64 K/UL (ref 1.82–7.42)
NEUTROPHILS NFR BLD: 65 % (ref 44–72)
NRBC # BLD AUTO: 0 K/UL
NRBC BLD-RTO: 0 /100 WBC
PATHOLOGY CONSULT NOTE: NORMAL
PLATELET # BLD AUTO: 388 K/UL (ref 164–446)
PMV BLD AUTO: 9 FL (ref 9–12.9)
POTASSIUM SERPL-SCNC: 3.2 MMOL/L (ref 3.6–5.5)
RBC # BLD AUTO: 4.48 M/UL (ref 4.7–6.1)
SODIUM SERPL-SCNC: 135 MMOL/L (ref 135–145)
WBC # BLD AUTO: 11.8 K/UL (ref 4.8–10.8)

## 2021-08-04 PROCEDURE — 47000 NEEDLE BIOPSY OF LIVER PERQ: CPT

## 2021-08-04 PROCEDURE — 85025 COMPLETE CBC W/AUTO DIFF WBC: CPT

## 2021-08-04 PROCEDURE — 96375 TX/PRO/DX INJ NEW DRUG ADDON: CPT | Mod: XU

## 2021-08-04 PROCEDURE — 96366 THER/PROPH/DIAG IV INF ADDON: CPT

## 2021-08-04 PROCEDURE — 88307 TISSUE EXAM BY PATHOLOGIST: CPT

## 2021-08-04 PROCEDURE — 96372 THER/PROPH/DIAG INJ SC/IM: CPT

## 2021-08-04 PROCEDURE — 700111 HCHG RX REV CODE 636 W/ 250 OVERRIDE (IP)

## 2021-08-04 PROCEDURE — 76705 ECHO EXAM OF ABDOMEN: CPT

## 2021-08-04 PROCEDURE — 93306 TTE W/DOPPLER COMPLETE: CPT | Mod: 26 | Performed by: INTERNAL MEDICINE

## 2021-08-04 PROCEDURE — G0378 HOSPITAL OBSERVATION PER HR: HCPCS

## 2021-08-04 PROCEDURE — 700111 HCHG RX REV CODE 636 W/ 250 OVERRIDE (IP): Performed by: INTERNAL MEDICINE

## 2021-08-04 PROCEDURE — 82962 GLUCOSE BLOOD TEST: CPT

## 2021-08-04 PROCEDURE — 94660 CPAP INITIATION&MGMT: CPT

## 2021-08-04 PROCEDURE — 99152 MOD SED SAME PHYS/QHP 5/>YRS: CPT

## 2021-08-04 PROCEDURE — 99226 PR SUBSEQUENT OBSERVATION CARE,LEVEL III: CPT | Performed by: INTERNAL MEDICINE

## 2021-08-04 PROCEDURE — 93306 TTE W/DOPPLER COMPLETE: CPT

## 2021-08-04 PROCEDURE — 700101 HCHG RX REV CODE 250: Performed by: INTERNAL MEDICINE

## 2021-08-04 PROCEDURE — 700111 HCHG RX REV CODE 636 W/ 250 OVERRIDE (IP): Performed by: RADIOLOGY

## 2021-08-04 PROCEDURE — 80048 BASIC METABOLIC PNL TOTAL CA: CPT

## 2021-08-04 PROCEDURE — 700105 HCHG RX REV CODE 258: Performed by: INTERNAL MEDICINE

## 2021-08-04 PROCEDURE — A9270 NON-COVERED ITEM OR SERVICE: HCPCS | Performed by: INTERNAL MEDICINE

## 2021-08-04 PROCEDURE — 96376 TX/PRO/DX INJ SAME DRUG ADON: CPT

## 2021-08-04 PROCEDURE — 700117 HCHG RX CONTRAST REV CODE 255: Performed by: INTERNAL MEDICINE

## 2021-08-04 PROCEDURE — 88313 SPECIAL STAINS GROUP 2: CPT

## 2021-08-04 PROCEDURE — 700102 HCHG RX REV CODE 250 W/ 637 OVERRIDE(OP): Performed by: INTERNAL MEDICINE

## 2021-08-04 RX ORDER — SODIUM CHLORIDE 9 MG/ML
500 INJECTION, SOLUTION INTRAVENOUS
Status: ACTIVE | OUTPATIENT
Start: 2021-08-04 | End: 2021-08-04

## 2021-08-04 RX ORDER — MIDAZOLAM HYDROCHLORIDE 1 MG/ML
.5-2 INJECTION INTRAMUSCULAR; INTRAVENOUS PRN
Status: ACTIVE | OUTPATIENT
Start: 2021-08-04 | End: 2021-08-04

## 2021-08-04 RX ORDER — ONDANSETRON 2 MG/ML
4 INJECTION INTRAMUSCULAR; INTRAVENOUS PRN
Status: ACTIVE | OUTPATIENT
Start: 2021-08-04 | End: 2021-08-04

## 2021-08-04 RX ORDER — MORPHINE SULFATE 4 MG/ML
4 INJECTION, SOLUTION INTRAMUSCULAR; INTRAVENOUS
Status: DISCONTINUED | OUTPATIENT
Start: 2021-08-04 | End: 2021-08-05 | Stop reason: HOSPADM

## 2021-08-04 RX ORDER — MIDAZOLAM HYDROCHLORIDE 1 MG/ML
INJECTION INTRAMUSCULAR; INTRAVENOUS
Status: COMPLETED
Start: 2021-08-04 | End: 2021-08-04

## 2021-08-04 RX ADMIN — MIDAZOLAM HYDROCHLORIDE 2 MG: 1 INJECTION, SOLUTION INTRAMUSCULAR; INTRAVENOUS at 16:11

## 2021-08-04 RX ADMIN — MORPHINE SULFATE 4 MG: 4 INJECTION INTRAVENOUS at 17:59

## 2021-08-04 RX ADMIN — ROSUVASTATIN CALCIUM 20 MG: 20 TABLET, FILM COATED ORAL at 20:33

## 2021-08-04 RX ADMIN — INSULIN GLARGINE 25 UNITS: 100 INJECTION, SOLUTION SUBCUTANEOUS at 20:38

## 2021-08-04 RX ADMIN — GABAPENTIN 600 MG: 300 CAPSULE ORAL at 20:33

## 2021-08-04 RX ADMIN — HUMAN ALBUMIN MICROSPHERES AND PERFLUTREN 3 ML: 10; .22 INJECTION, SOLUTION INTRAVENOUS at 16:21

## 2021-08-04 RX ADMIN — FENTANYL CITRATE 50 MCG: 50 INJECTION, SOLUTION INTRAMUSCULAR; INTRAVENOUS at 16:11

## 2021-08-04 RX ADMIN — INSULIN LISPRO 3 UNITS: 100 INJECTION, SOLUTION INTRAVENOUS; SUBCUTANEOUS at 20:38

## 2021-08-04 RX ADMIN — FENTANYL CITRATE 25 MCG: 50 INJECTION, SOLUTION INTRAMUSCULAR; INTRAVENOUS at 16:16

## 2021-08-04 RX ADMIN — ACETAMINOPHEN 1000 MG: 500 TABLET, FILM COATED ORAL at 20:33

## 2021-08-04 RX ADMIN — CEFTRIAXONE SODIUM 2 G: 10 INJECTION, POWDER, FOR SOLUTION INTRAVENOUS at 05:25

## 2021-08-04 RX ADMIN — AZITHROMYCIN MONOHYDRATE 500 MG: 500 INJECTION, POWDER, LYOPHILIZED, FOR SOLUTION INTRAVENOUS at 05:25

## 2021-08-04 RX ADMIN — GABAPENTIN 600 MG: 300 CAPSULE ORAL at 05:25

## 2021-08-04 RX ADMIN — MIDAZOLAM HYDROCHLORIDE 1 MG: 1 INJECTION, SOLUTION INTRAMUSCULAR; INTRAVENOUS at 16:16

## 2021-08-04 RX ADMIN — LISINOPRIL 40 MG: 20 TABLET ORAL at 05:25

## 2021-08-04 RX ADMIN — INSULIN LISPRO 3 UNITS: 100 INJECTION, SOLUTION INTRAVENOUS; SUBCUTANEOUS at 07:48

## 2021-08-04 RX ADMIN — INSULIN LISPRO 3 UNITS: 100 INJECTION, SOLUTION INTRAVENOUS; SUBCUTANEOUS at 17:53

## 2021-08-04 ASSESSMENT — FIBROSIS 4 INDEX: FIB4 SCORE: 1.03

## 2021-08-04 ASSESSMENT — PAIN DESCRIPTION - PAIN TYPE
TYPE: ACUTE PAIN

## 2021-08-04 NOTE — PROGRESS NOTES
Hospital Medicine Daily Progress Note    Date of Service  8/3/2021    Chief Complaint  Jose Alvarez is a 71 y.o. male admitted 8/2/2021 with shortness of breath    Hospital Course  71-year-old male with a past medical history of obesity, LUCIO on CPAP, diabetes, hypertension and asbestos exposure presented with shortness of breath, night sweats and poor appetite.  CTA chest showed no PE but revealed pleural-based nodules, pulmonary nodules and a hepatic lesion.  Pulmonology was consulted and recommended CT-guided biopsy.      Interval Problem Update  Patient seen and evaluated at bedside.  He reports improvement in his shortness of breath.  Patient is planned for CT-guided biopsy of liver lesion tomorrow by IR.  N.p.o. at midnight    I have personally seen and examined the patient at bedside. I discussed the plan of care with patient and bedside RN.    Consultants/Specialty  pulmonary and IR    Code Status  Full Code    Disposition  Patient is not medically cleared.   Anticipate discharge to to home with close outpatient follow-up.  I have placed the appropriate orders for post-discharge needs.    Review of Systems  Review of Systems   All other systems reviewed and are negative.       Physical Exam  Temp:  [36.8 °C (98.2 °F)-37.4 °C (99.4 °F)] 36.9 °C (98.4 °F)  Pulse:  [78-99] 96  Resp:  [14-18] 16  BP: (110-162)/(61-84) 138/74  SpO2:  [91 %-95 %] 94 %    Physical Exam  Vitals and nursing note reviewed.   Constitutional:       General: He is not in acute distress.     Appearance: He is obese.   HENT:      Head: Normocephalic and atraumatic.      Nose: Nose normal.      Mouth/Throat:      Mouth: Mucous membranes are moist.   Eyes:      Extraocular Movements: Extraocular movements intact.      Conjunctiva/sclera: Conjunctivae normal.      Pupils: Pupils are equal, round, and reactive to light.   Cardiovascular:      Rate and Rhythm: Normal rate and regular rhythm.      Pulses: Normal pulses.      Heart sounds:  Normal heart sounds.   Pulmonary:      Effort: Pulmonary effort is normal. No respiratory distress.      Breath sounds: Normal breath sounds. No wheezing, rhonchi or rales.   Abdominal:      General: Bowel sounds are normal. There is no distension.      Palpations: Abdomen is soft.      Tenderness: There is no abdominal tenderness.   Musculoskeletal:         General: No swelling or tenderness. Normal range of motion.      Cervical back: Normal range of motion and neck supple.   Lymphadenopathy:      Cervical: No cervical adenopathy.   Skin:     General: Skin is warm.      Coloration: Skin is not jaundiced.      Findings: No rash.   Neurological:      General: No focal deficit present.      Mental Status: He is alert and oriented to person, place, and time.      Cranial Nerves: No cranial nerve deficit.      Motor: No weakness.   Psychiatric:         Mood and Affect: Mood normal.         Behavior: Behavior normal.         Fluids  No intake or output data in the 24 hours ending 08/03/21 1758    Laboratory  Recent Labs     08/02/21  1200 08/03/21  0329   WBC 10.9* 10.9*   RBC 4.57* 4.38*   HEMOGLOBIN 13.4* 12.7*   HEMATOCRIT 41.6* 40.3*   MCV 91.0 92.0   MCH 29.3 29.0   MCHC 32.2* 31.5*   RDW 47.6 48.6   PLATELETCT 435 405   MPV 8.8* 9.1     Recent Labs     08/02/21  1200 08/03/21  0329   SODIUM 138 136   POTASSIUM 3.4* 2.9*   CHLORIDE 100 100   CO2 23 24   GLUCOSE 210* 193*   BUN 12 13   CREATININE 0.85 0.73   CALCIUM 8.9 8.8     Recent Labs     08/02/21  1225   APTT 34.2   INR 1.17*         Recent Labs     08/03/21  0329   TRIGLYCERIDE 149   HDL 20*   LDL 82       Imaging  CT-ABDOMEN-PELVIS WITH   Final Result      1.  Multiple irregular hypodense liver lesions is consistent with metastatic disease.   2.  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   3.  Nonspecific sclerotic lesion in the right pubic bone. Nuclear medicine bone scan could be performed to further evaluate.      CT-CTA  CHEST PULMONARY ARTERY W/ RECONS   Final Result      1.  Limited exam secondary to respiratory motion. No pulmonary embolus is identified.      2.  Multiple pleural-based nodular opacities and bilateral subcentimeter pulmonary nodules. Findings are suspicious for metastatic disease.      3.  Mediastinal and hilar adenopathy, suspicious for metastatic disease.      4.  Hypodense hepatic lesion is poorly characterize, but concerning for malignancy.      5.  Additional adenopathy in the upper abdomen is concerning for malignant metastatic disease as well.            DX-CHEST-PORTABLE (1 VIEW)   Final Result      No evidence of acute cardiopulmonary process.      EC-ECHOCARDIOGRAM COMPLETE W/O CONT    (Results Pending)   CT-NEEDLE BX-LIVER    (Results Pending)        Assessment/Plan  * Dyspnea, multiple lung nodules  Assessment & Plan  Not hypoxic.  Telemetry, trend troponins, baby aspirin, ordered lipid panel, echo ordered   Address pulmonary issues below  UPDATE: I spoke with Dr. Moss who prefers CT guided biopsy  I ordered the CT guided biopsy per IR  NPO after MN  A PET CT scan and MRI brain also can be ordered afterwards but this can be done outpatient.            Pneumonia due to infectious organism  Assessment & Plan  Patient has been started on IV Ceftriaxone and azithromycin  pro calcitonin was elevated  RT protocol  Continue supplemental oxygen, wean as tolerated  Follow blood cultures      Leukocytosis  Assessment & Plan  Ordered procalcitonin. If elevated please start antibiotics for pneumonia.      Chronic renal insufficiency, stage 3 (moderate) (HCC)- (present on admission)  Assessment & Plan  Stable    Morbid obesity (HCC)- (present on admission)  Assessment & Plan  When better, healthy diet, lifestyle, exercise and weight loss  Defer bariatric referral to PCP    Essential hypertension- (present on admission)  Assessment & Plan  Uncontrolled  PRN IV antihypertensives  Continue current home meds and  their doses,adjust doses depending on response.    Asthma due to environmental allergies- (present on admission)  Assessment & Plan  Currently not exacerbatingPRN lyn    Type 2 diabetes mellitus with neurologic complication, with long-term current use of insulin (HCC)- (present on admission)  Assessment & Plan  Uncontrolled  For now will do current regimen  Tend BGs and adjust insulin regimen depending on results  Diabetes education consulted.       VTE prophylaxis: SCDs/TEDs    I have performed a physical exam and reviewed and updated ROS and Plan today (8/3/2021). In review of yesterday's note (8/2/2021), there are no changes except as documented above.

## 2021-08-04 NOTE — PROGRESS NOTES
Diabetes education: Met with pt and family this afternoon. Please see consult note.  Plan: CDE to follow up tomorrow with sample syringes. CDE to continue to follow.

## 2021-08-04 NOTE — PROGRESS NOTES
IR RN note    Reviewed sedation orders with MD  Patient underwent a Liver Biopsy by Dr. Masterson.  Procedure site was verified by MD using imaging guidance. Consent was signed.  IR tech Mya assisted. Patient was placed in a supine position.  Vitals were taken every 5 minutes and remained stable during procedure (see doc flow sheet for results).  CO2 waveform capnography was monitored throughout procedure, see chart.  Right upper quadrant abdomen access site.   A bandaid dressing was placed over surgical site. Report called to Lilly BUTTERFIELD. Pt transported by bed with RN to T817.     3 CORES in formalin from liver biopsy sent to lab for testing.    [FreeTextEntry1] : 80 y.o. woman with right shoulder pain of unclear etiology

## 2021-08-04 NOTE — CONSULTS
Diabetes education: Met with pt and family this afternoon. Pt has a hx of diabetes and is followed by Dr. Nielsen for diabetes. Pt is on Regular insulin 75 units 2 x day ( was taking second dose after dinner because unsure of what time dinner was and would not have the 30 minutes needed.. recommended he take it as soon as he begins eating). Pt was taking Trulicity, with samples given and it was working well ( became nauseated with Ozempic) , but cost was too much for him to continue. Recommended family send message via My Chart to endo office asking for PA or samples if available. Pt also takes Glipizide 10 mg BID. Pt uses regular from [facility] secondary to cost. Family stated they were having trouble getting syringes from [facility] as they were not available. CDE to bring sample bags of syringes tomorrow.  Pt was admitted with blood sugar of 210, and a Hg a1c needs to be ordered as the last one was from 11/24/20 ( 7.5%).  Pt is currently on Glipizide 10 mg BID, Semglee 25 units pm Admelog, 8 units tid meals and sliding scale ac and hs with blood sugars of  198 ( NPO), 168 ( 3 + 8 units) and 205 ( 4 + 8 units).  Plan: CDE to follow up tomorrow with sample syringes. CDE to continue to follow.

## 2021-08-04 NOTE — CARE PLAN
The patient is Stable - Low risk of patient condition declining or worsening    Shift Goals  Clinical Goals: NPO for biopsy, control pain, ambulate  Patient Goals: Go to biopsy     Progress made toward(s) clinical / shift goals:    Problem: Hemodynamics  Goal: Patient's hemodynamics, fluid balance and neurologic status will be stable or improve  Outcome: Progressing     Problem: Fluid Volume  Goal: Fluid volume balance will be maintained  Outcome: Progressing     Problem: Urinary - Renal Perfusion  Goal: Ability to achieve and maintain adequate renal perfusion and functioning will improve  Outcome: Progressing     Problem: Respiratory  Goal: Patient will achieve/maintain optimum respiratory ventilation and gas exchange  Outcome: Progressing     Problem: Risk for Aspiration  Goal: Patient's risk for aspiration will be absent or decrease  Outcome: Progressing     Problem: Self Care  Goal: Patient will have the ability to perform ADLs independently or with assistance (bathe, groom, dress, toilet and feed)  Outcome: Progressing     Problem: Discharge Planning - Pneumonia  Goal: Patient will verbalize understanding of lifestyle changes and therapeutic needs post discharge  Outcome: Progressing

## 2021-08-04 NOTE — DISCHARGE PLANNING
Anticipated Discharge Disposition: Home    Action: per IDT rounds, pt pending medical clearance, no case management needs identified.    Barriers to Discharge: medical clearance    Plan: DC to home when medically cleared.

## 2021-08-04 NOTE — DISCHARGE INSTRUCTIONS
Discharge Instructions    Discharged to home by car with friend. Discharged via wheelchair, hospital escort: Yes.  Special equipment needed: Not Applicable    Be sure to schedule a follow-up appointment with your primary care doctor or any specialists as instructed.     Discharge Plan:        I understand that a diet low in cholesterol, fat, and sodium is recommended for good health. Unless I have been given specific instructions below for another diet, I accept this instruction as my diet prescription.   Other diet: CHO diet     Special Instructions: None    · Is patient discharged on Warfarin / Coumadin?   No     Depression / Suicide Risk    As you are discharged from this [facility] facility, it is important to learn how to keep safe from harming yourself.    Recognize the warning signs:  · Abrupt changes in personality, positive or negative- including increase in energy   · Giving away possessions  · Change in eating patterns- significant weight changes-  positive or negative  · Change in sleeping patterns- unable to sleep or sleeping all the time   · Unwillingness or inability to communicate  · Depression  · Unusual sadness, discouragement and loneliness  · Talk of wanting to die  · Neglect of personal appearance   · Rebelliousness- reckless behavior  · Withdrawal from people/activities they love  · Confusion- inability to concentrate     If you or a loved one observes any of these behaviors or has concerns about self-harm, here's what you can do:  · Talk about it- your feelings and reasons for harming yourself  · Remove any means that you might use to hurt yourself (examples: pills, rope, extension cords, firearm)  · Get professional help from the community (Mental Health, Substance Abuse, psychological counseling)  · Do not be alone:Call your Safe Contact- someone whom you trust who will be there for you.  · Call your local CRISIS HOTLINE 689-5089 or 766-924-0701  · Call your local Children's Mobile Crisis  Response Team [facility] (768) 393-4269 or www.BillGuard  · Call the toll free National Suicide Prevention Hotlines   · National Suicide Prevention Lifeline 243-741-GSOB (0431)  · National Hope Line Network 800-SUICIDE (324-4497)      Pulmonary Nodule  A pulmonary nodule is tissue that has grown on your lung. A nodule may be cancer, but most nodules are not cancer.  Follow these instructions at home:    · Take over-the-counter and prescription medicines only as told by your doctor.  · Do not use any products that have nicotine or tobacco, such as cigarettes and e-cigarettes. If you need help quitting, ask your doctor.  · Keep all follow-up visits as told by your doctor. This is important.  Contact a doctor if:  · You have trouble breathing when doing activities.  · You feel sick.  · You feel more tired than normal.  · You do not feel like eating.  · You lose weight without trying.  · You have chills.  · You have night sweats.  Get help right away if:  · You cannot catch your breath.  · You start making whistling sounds when breathing (wheezing).  · You cannot stop coughing.  · You cough up blood.  · You get dizzy.  · You feel like you are going to pass out (faint).  · You have sudden chest pain.  · You have a fever or symptoms for more than 2-3 days.  · You have a fever and your symptoms suddenly get worse.  Summary  · A pulmonary nodule is tissue that has grown on your lung.  · Most nodules are not cancer.  · Your doctor will do tests to know what kind of nodule you have, and whether you need treatment for it.  This information is not intended to replace advice given to you by your health care provider. Make sure you discuss any questions you have with your health care provider.  Document Released: 01/20/2012 Document Revised: 01/11/2019 Document Reviewed: 01/16/2018  Elsevier Patient Education © 2020 Elsevier Inc.      Community-Acquired Pneumonia, Adult  Pneumonia is an infection of the lungs. It causes  swelling in the airways of the lungs. Mucus and fluid may also build up inside the airways.  One type of pneumonia can happen while a person is in a hospital. A different type can happen when a person is not in a hospital (community-acquired pneumonia).   What are the causes?    This condition is caused by germs (viruses, bacteria, or fungi). Some types of germs can be passed from one person to another. This can happen when you breathe in droplets from the cough or sneeze of an infected person.  What increases the risk?  You are more likely to develop this condition if you:  · Have a long-term (chronic) disease, such as:  ? Chronic obstructive pulmonary disease (COPD).  ? Asthma.  ? Cystic fibrosis.  ? Congestive heart failure.  ? Diabetes.  ? Kidney disease.  · Have HIV.  · Have sickle cell disease.  · Have had your spleen removed.  · Do not take good care of your teeth and mouth (poor dental hygiene).  · Have a medical condition that increases the risk of breathing in droplets from your own mouth and nose.  · Have a weakened body defense system (immune system).  · Are a smoker.  · Travel to areas where the germs that cause this illness are common.  · Are around certain animals or the places they live.  What are the signs or symptoms?  · A dry cough.  · A wet (productive) cough.  · Fever.  · Sweating.  · Chest pain. This often happens when breathing deeply or coughing.  · Fast breathing or trouble breathing.  · Shortness of breath.  · Shaking chills.  · Feeling tired (fatigue).  · Muscle aches.  How is this treated?  Treatment for this condition depends on many things. Most adults can be treated at home. In some cases, treatment must happen in a hospital. Treatment may include:  · Medicines given by mouth or through an IV tube.  · Being given extra oxygen.  · Respiratory therapy.  In rare cases, treatment for very bad pneumonia may include:  · Using a machine to help you breathe.  · Having a procedure to remove  fluid from around your lungs.  Follow these instructions at home:  Medicines  · Take over-the-counter and prescription medicines only as told by your doctor.  ? Only take cough medicine if you are losing sleep.  · If you were prescribed an antibiotic medicine, take it as told by your doctor. Do not stop taking the antibiotic even if you start to feel better.  General instructions    · Sleep with your head and neck raised (elevated). You can do this by sleeping in a recliner or by putting a few pillows under your head.  · Rest as needed. Get at least 8 hours of sleep each night.  · Drink enough water to keep your pee (urine) pale yellow.  · Eat a healthy diet that includes plenty of vegetables, fruits, whole grains, low-fat dairy products, and lean protein.  · Do not use any products that contain nicotine or tobacco. These include cigarettes, e-cigarettes, and chewing tobacco. If you need help quitting, ask your doctor.  · Keep all follow-up visits as told by your doctor. This is important.  How is this prevented?  A shot (vaccine) can help prevent pneumonia. Shots are often suggested for:  · People older than 65 years of age.  · People older than 19 years of age who:  ? Are having cancer treatment.  ? Have long-term (chronic) lung disease.  ? Have problems with their body's defense system.  You may also prevent pneumonia if you take these actions:  · Get the flu (influenza) shot every year.  · Go to the dentist as often as told.  · Wash your hands often. If you cannot use soap and water, use hand .  Contact a doctor if:  · You have a fever.  · You lose sleep because your cough medicine does not help.  Get help right away if:  · You are short of breath and it gets worse.  · You have more chest pain.  · Your sickness gets worse. This is very serious if:  ? You are an older adult.  ? Your body's defense system is weak.  · You cough up blood.  Summary  · Pneumonia is an infection of the lungs.  · Most adults  can be treated at home. Some will need treatment in a hospital.  · Drink enough water to keep your pee pale yellow.  · Get at least 8 hours of sleep each night.  This information is not intended to replace advice given to you by your health care provider. Make sure you discuss any questions you have with your health care provider.  Document Released: 06/05/2009 Document Revised: 04/08/2020 Document Reviewed: 08/15/2019  [employer] Patient Education © 2020 [employer]    Chest Pain, Nonspecific  It is often hard to give a specific diagnosis for the cause of chest pain. There is always a chance that your pain could be related to something serious, like a heart attack or a blood clot in the lungs. You need to follow up with your caregiver for further evaluation. More lab tests or other studies such as X-rays, electrocardiography, stress testing, or cardiac imaging may be needed to find the cause of your pain.  Most of the time, nonspecific chest pain improves within 2 to 3 days with rest and mild pain medicine. For the next few days, avoid physical exertion or activities that bring on pain. Do not smoke. Avoid drinking alcohol. Call your caregiver for routine follow-up as advised.   SEEK IMMEDIATE MEDICAL CARE IF:  · You develop increased chest pain or pain that radiates to the arm, neck, jaw, back, or abdomen.   · You develop shortness of breath, increased coughing, or you start coughing up blood.   · You have severe back or abdominal pain, nausea, or vomiting.   · You develop severe weakness, fainting, fever, or chills.   Document Released: 12/18/2006 Document Revised: 03/11/2013 Document Reviewed: 06/06/2008  [employer]® Patient Information ©2013 [employer].

## 2021-08-04 NOTE — OR SURGEON
Immediate Post- Operative Note        PostOp Diagnosis: RIGHT liver mass      Procedure(s): USG RIGHT liver mass biopsy      Estimated Blood Loss: Less than 5 ml        Complications: None            8/4/2021     4:25 PM     Marcial Masterson M.D.

## 2021-08-04 NOTE — PROGRESS NOTES
Assumed care of patient at bedside report from NOC RN. Updated on POC. Patient currently A & O x 4; on room air; up in bed; no complaints of acute pain. Call light within reach. Whiteboard updated. Fall precautions in place. Bed locked and in lowest position. All questions answered. No other needs indicated at this time.

## 2021-08-05 VITALS
RESPIRATION RATE: 18 BRPM | HEIGHT: 67 IN | WEIGHT: 268.52 LBS | BODY MASS INDEX: 42.15 KG/M2 | TEMPERATURE: 98.1 F | SYSTOLIC BLOOD PRESSURE: 129 MMHG | HEART RATE: 71 BPM | OXYGEN SATURATION: 98 % | DIASTOLIC BLOOD PRESSURE: 73 MMHG

## 2021-08-05 PROBLEM — J96.01 ACUTE RESPIRATORY FAILURE WITH HYPOXIA (HCC): Status: RESOLVED | Noted: 2021-08-02 | Resolved: 2021-08-05

## 2021-08-05 PROBLEM — J18.9 PNEUMONIA DUE TO INFECTIOUS ORGANISM: Status: RESOLVED | Noted: 2021-08-02 | Resolved: 2021-08-05

## 2021-08-05 LAB
ANION GAP SERPL CALC-SCNC: 11 MMOL/L (ref 7–16)
BACTERIA BLD CULT: ABNORMAL
BACTERIA BLD CULT: ABNORMAL
BASOPHILS # BLD AUTO: 0.3 % (ref 0–1.8)
BASOPHILS # BLD: 0.04 K/UL (ref 0–0.12)
BUN SERPL-MCNC: 16 MG/DL (ref 8–22)
CALCIUM SERPL-MCNC: 8.8 MG/DL (ref 8.5–10.5)
CHLORIDE SERPL-SCNC: 98 MMOL/L (ref 96–112)
CO2 SERPL-SCNC: 26 MMOL/L (ref 20–33)
CREAT SERPL-MCNC: 0.75 MG/DL (ref 0.5–1.4)
EOSINOPHIL # BLD AUTO: 0.81 K/UL (ref 0–0.51)
EOSINOPHIL NFR BLD: 7.1 % (ref 0–6.9)
ERYTHROCYTE [DISTWIDTH] IN BLOOD BY AUTOMATED COUNT: 49.4 FL (ref 35.9–50)
GLUCOSE BLD-MCNC: 216 MG/DL (ref 65–99)
GLUCOSE SERPL-MCNC: 206 MG/DL (ref 65–99)
HCT VFR BLD AUTO: 40.1 % (ref 42–52)
HGB BLD-MCNC: 12.5 G/DL (ref 14–18)
IMM GRANULOCYTES # BLD AUTO: 0.08 K/UL (ref 0–0.11)
IMM GRANULOCYTES NFR BLD AUTO: 0.7 % (ref 0–0.9)
LYMPHOCYTES # BLD AUTO: 1.49 K/UL (ref 1–4.8)
LYMPHOCYTES NFR BLD: 13 % (ref 22–41)
MCH RBC QN AUTO: 28.9 PG (ref 27–33)
MCHC RBC AUTO-ENTMCNC: 31.2 G/DL (ref 33.7–35.3)
MCV RBC AUTO: 92.6 FL (ref 81.4–97.8)
MONOCYTES # BLD AUTO: 1.15 K/UL (ref 0–0.85)
MONOCYTES NFR BLD AUTO: 10 % (ref 0–13.4)
NEUTROPHILS # BLD AUTO: 7.91 K/UL (ref 1.82–7.42)
NEUTROPHILS NFR BLD: 68.9 % (ref 44–72)
NRBC # BLD AUTO: 0 K/UL
NRBC BLD-RTO: 0 /100 WBC
PLATELET # BLD AUTO: 394 K/UL (ref 164–446)
PMV BLD AUTO: 9 FL (ref 9–12.9)
POTASSIUM SERPL-SCNC: 3.4 MMOL/L (ref 3.6–5.5)
RBC # BLD AUTO: 4.33 M/UL (ref 4.7–6.1)
SIGNIFICANT IND 70042: ABNORMAL
SITE SITE: ABNORMAL
SODIUM SERPL-SCNC: 135 MMOL/L (ref 135–145)
SOURCE SOURCE: ABNORMAL
WBC # BLD AUTO: 11.5 K/UL (ref 4.8–10.8)

## 2021-08-05 PROCEDURE — 80048 BASIC METABOLIC PNL TOTAL CA: CPT

## 2021-08-05 PROCEDURE — 94660 CPAP INITIATION&MGMT: CPT

## 2021-08-05 PROCEDURE — 96372 THER/PROPH/DIAG INJ SC/IM: CPT

## 2021-08-05 PROCEDURE — 700105 HCHG RX REV CODE 258: Performed by: INTERNAL MEDICINE

## 2021-08-05 PROCEDURE — 700102 HCHG RX REV CODE 250 W/ 637 OVERRIDE(OP): Performed by: INTERNAL MEDICINE

## 2021-08-05 PROCEDURE — A9270 NON-COVERED ITEM OR SERVICE: HCPCS | Performed by: INTERNAL MEDICINE

## 2021-08-05 PROCEDURE — G0378 HOSPITAL OBSERVATION PER HR: HCPCS

## 2021-08-05 PROCEDURE — 82962 GLUCOSE BLOOD TEST: CPT

## 2021-08-05 PROCEDURE — 700101 HCHG RX REV CODE 250: Performed by: INTERNAL MEDICINE

## 2021-08-05 PROCEDURE — 96376 TX/PRO/DX INJ SAME DRUG ADON: CPT

## 2021-08-05 PROCEDURE — 700111 HCHG RX REV CODE 636 W/ 250 OVERRIDE (IP): Performed by: INTERNAL MEDICINE

## 2021-08-05 PROCEDURE — 96366 THER/PROPH/DIAG IV INF ADDON: CPT

## 2021-08-05 PROCEDURE — 85025 COMPLETE CBC W/AUTO DIFF WBC: CPT

## 2021-08-05 PROCEDURE — 99217 PR OBSERVATION CARE DISCHARGE: CPT | Performed by: INTERNAL MEDICINE

## 2021-08-05 RX ORDER — OXYCODONE HYDROCHLORIDE 5 MG/1
5 TABLET ORAL EVERY 6 HOURS PRN
Qty: 20 TABLET | Refills: 0 | Status: SHIPPED | OUTPATIENT
Start: 2021-08-05 | End: 2021-08-10

## 2021-08-05 RX ORDER — CEFDINIR 300 MG/1
300 CAPSULE ORAL 2 TIMES DAILY
Qty: 8 CAPSULE | Refills: 0 | Status: SHIPPED | OUTPATIENT
Start: 2021-08-05 | End: 2021-08-09

## 2021-08-05 RX ADMIN — Medication 1 CAPSULE: at 07:41

## 2021-08-05 RX ADMIN — CEFTRIAXONE SODIUM 2 G: 10 INJECTION, POWDER, FOR SOLUTION INTRAVENOUS at 04:01

## 2021-08-05 RX ADMIN — LISINOPRIL 40 MG: 20 TABLET ORAL at 04:00

## 2021-08-05 RX ADMIN — GABAPENTIN 600 MG: 300 CAPSULE ORAL at 04:00

## 2021-08-05 RX ADMIN — AZITHROMYCIN MONOHYDRATE 500 MG: 500 INJECTION, POWDER, LYOPHILIZED, FOR SOLUTION INTRAVENOUS at 04:00

## 2021-08-05 RX ADMIN — INSULIN LISPRO 4 UNITS: 100 INJECTION, SOLUTION INTRAVENOUS; SUBCUTANEOUS at 07:38

## 2021-08-05 RX ADMIN — MORPHINE SULFATE 4 MG: 4 INJECTION INTRAVENOUS at 03:48

## 2021-08-05 RX ADMIN — GLIPIZIDE 10 MG: 10 TABLET ORAL at 07:41

## 2021-08-05 RX ADMIN — INSULIN LISPRO 8 UNITS: 100 INJECTION, SOLUTION INTRAVENOUS; SUBCUTANEOUS at 07:39

## 2021-08-05 ASSESSMENT — ENCOUNTER SYMPTOMS
HEADACHES: 0
SHORTNESS OF BREATH: 0
COUGH: 0
WEAKNESS: 0
ABDOMINAL PAIN: 0
FEVER: 0
DIZZINESS: 0
HEMOPTYSIS: 0
PALPITATIONS: 0
EYE PAIN: 0
MYALGIAS: 0
DEPRESSION: 0
BLURRED VISION: 0
VOMITING: 0
CHILLS: 0
BRUISES/BLEEDS EASILY: 0

## 2021-08-05 ASSESSMENT — PAIN DESCRIPTION - PAIN TYPE: TYPE: ACUTE PAIN

## 2021-08-05 NOTE — CARE PLAN
The patient is Watcher - Medium risk of patient condition declining or worsening    Shift Goals  Clinical Goals: NPO for biopsy, control pain, ambulate  Patient Goals: Go to biopsy     Progress made toward(s) clinical / shift goals:    Problem: Pain - Standard  Goal: Alleviation of pain or a reduction in pain to the patient’s comfort goal  Outcome: Progressing  Note: Pt assessed for pain regularly and medicated PRN per MAR.

## 2021-08-05 NOTE — DISCHARGE SUMMARY
Discharge Summary    CHIEF COMPLAINT ON ADMISSION  Chief Complaint   Patient presents with   • Chest Pain   • Shortness of Breath   • Loss of Appetite       Reason for Admission  Chest Pain; SOB     Admission Date  8/2/2021    CODE STATUS  Prior    HPI & HOSPITAL COURSE  71-year-old male with a past medical history of obesity, LUCIO on CPAP, diabetes, hypertension and asbestos exposure presented with shortness of breath, night sweats and poor appetite.  CTA chest showed no PE but revealed pleural-based nodules, pulmonary nodules and a hepatic lesion.  Pulmonology was consulted and recommended CT-guided biopsy.    He also underwent a CT abdomen that revealed a large right liver mass.  He underwent ultrasound-guided biopsy of right liver mass.  He has been instructed to follow-up with PCP for biopsy results and has been given a referral for oncology      Therefore, he is discharged in fair and stable condition to home with close outpatient follow-up.    The patient recovered much more quickly than anticipated on admission.    Discharge Date  8/5/2021    FOLLOW UP ITEMS POST DISCHARGE  Follow up with pcp    DISCHARGE DIAGNOSES  Principal Problem (Resolved):    Acute respiratory failure with hypoxia (HCC) POA: Unknown  Active Problems:    Type 2 diabetes mellitus with neurologic complication, with long-term current use of insulin (HCC) POA: Yes    Asthma due to environmental allergies POA: Yes    Essential hypertension POA: Yes    Morbid obesity (HCC) POA: Yes    Chronic renal insufficiency, stage 3 (moderate) (HCC) POA: Yes    Liver mass POA: Yes  Resolved Problems:    Pneumonia due to infectious organism POA: Unknown      FOLLOW UP  Future Appointments   Date Time Provider Department Center   8/10/2021  8:30 AM Herb Alexis M.D. [facility] Denny Sherman D.O.  [address]  [facility] [address]  [facility] [address]  [phone]    Go on 8/11/2021  Please go to your primary care office at 11:15am on 08/11/21 for  your appointment. Thank you.      MEDICATIONS ON DISCHARGE     Medication List      START taking these medications      Instructions   cefdinir 300 MG Caps  Commonly known as: OMNICEF   Take 1 capsule by mouth 2 times a day for 4 days.  Dose: 300 mg     oxyCODONE immediate-release 5 MG Tabs  Commonly known as: ROXICODONE   Take 1 tablet by mouth every 6 hours as needed for Severe Pain for up to 5 days.  Dose: 5 mg        CONTINUE taking these medications      Instructions   acetaminophen 500 MG Tabs  Commonly known as: TYLENOL   Take 1,000 mg by mouth every evening.  Dose: 1,000 mg     aspirin EC 81 MG Tbec  Commonly known as: ECOTRIN   Take 1 Tab by mouth every day.  Dose: 81 mg     FreeStyle Yarely 2 Sensor Systm Misc   1 Each every 14 days.  Dose: 1 Each     gabapentin 600 MG tablet  Commonly known as: NEURONTIN   Take 600 mg by mouth 2 times a day.  Dose: 600 mg     glipiZIDE 10 MG Tabs  Commonly known as: GLUCOTROL   Take 10 mg by mouth in the morning, at noon, and at bedtime. Three times daily  Dose: 10 mg     ibuprofen 200 MG Tabs  Commonly known as: MOTRIN   Take 600 mg by mouth every evening.  Dose: 600 mg     insulin regular human  Commonly known as: HUMULIN/NOVOLIN R   Inject 75 Units under the skin 2 times a day. 75 units, TWICE DAILY  Dose: 75 Units     lisinopril 20 MG Tabs  Commonly known as: PRINIVIL   Take 20 mg by mouth 2 times a day. TWICE DAILY  Dose: 20 mg     rosuvastatin 20 MG Tabs  Commonly known as: CRESTOR   Take 20 mg by mouth every evening.  Dose: 20 mg            Allergies  Allergies   Allergen Reactions   • Metformin      diarrhea   • Penicillins    • Sulfa Drugs        DIET  No orders of the defined types were placed in this encounter.      ACTIVITY  As tolerated.  Weight bearing as tolerated    CONSULTATIONS  IR    PROCEDURES  Liver biopsy    LABORATORY  Lab Results   Component Value Date    SODIUM 135 08/05/2021    POTASSIUM 3.4 (L) 08/05/2021    CHLORIDE 98 08/05/2021    CO2 26  08/05/2021    GLUCOSE 206 (H) 08/05/2021    BUN 16 08/05/2021    CREATININE 0.75 08/05/2021        Lab Results   Component Value Date    WBC 11.5 (H) 08/05/2021    HEMOGLOBIN 12.5 (L) 08/05/2021    HEMATOCRIT 40.1 (L) 08/05/2021    PLATELETCT 394 08/05/2021        Total time of the discharge process exceeds 40 minutes.

## 2021-08-05 NOTE — ASSESSMENT & PLAN NOTE
Status post ultrasound-guided biopsy  Follow-up biopsy results as outpatient with PCP  IV morphine for pain, monitor respiratory status closely

## 2021-08-05 NOTE — PROGRESS NOTES
"Radiology Progress Note   Author: GAGANDEEP Vital Date & Time created: 8/5/2021  11:29 AM   Date of admission  8/2/2021  Note to reader: this note follows the APSO format rather than the historical SOAP format. Assessment and plan located at the top of the note for ease of use.    Chief Complaint  71 y.o. male admitted 8/2/2021 with SOB    HPI  Jose Alvarez is a 71 y.o. male, with a history of asthma, diabetes, and dyslipidemia, who presents to the Emergency Department accompanied by his daughter, for worsening pleuritic chest pain with an onset of 2 weeks ago. Patient describes his chest pain as achy in quality without any radiation. He states his chest pain is not \"heart related,\" but comes on predominately when he coughs. Exacerbating factors include taking deep breaths, and coughing. No alleviating factors were identified. Daughter adds the patient has been having difficulty eating and drinking secondary to the pain. He reports associated sweats, chills, subjective fever, dyspnea on exertion, and cough.   Assessment/Plan  Interval History   Active Problems:    Type 2 diabetes mellitus with neurologic complication, with long-term current use of insulin (HCC)    Asthma due to environmental allergies    Essential hypertension    Morbid obesity (HCC)    Chronic renal insufficiency, stage 3 (moderate) (HCC)    Liver mass      Plan IR  - patient to follow up with primary MD as outpatient regarding Liver BX results this next week, he states that he will make an appointment today     - pending BX results  - ND home today   - Thank you for allowing Interventional Radiology team to participate in the patients care, if any additonal care or requests are needed in the future please do not hesitate call or place IR order      [id number]  IR:   8/4- Liver BX completed             Review of Systems  Physical Exam   Review of Systems   Constitutional: Positive for malaise/fatigue. Negative for chills and fever.   HENT: " Negative for hearing loss.    Eyes: Negative for blurred vision and pain.   Respiratory: Negative for cough, hemoptysis and shortness of breath.    Cardiovascular: Negative for chest pain and palpitations.   Gastrointestinal: Negative for abdominal pain and vomiting.   Genitourinary: Negative for dysuria.   Musculoskeletal: Negative for myalgias.   Skin: Negative for rash.   Neurological: Negative for dizziness, weakness and headaches.   Endo/Heme/Allergies: Does not bruise/bleed easily.   Psychiatric/Behavioral: Negative for depression.      Vitals:    08/05/21 0804   BP: 129/73   Pulse: 71   Resp: 18   Temp: 36.7 °C (98.1 °F)   SpO2: 98%      Physical Exam  Constitutional:       Appearance: He is obese.   HENT:      Head: Normocephalic.      Nose: Nose normal.      Mouth/Throat:      Mouth: Mucous membranes are moist.   Eyes:      Pupils: Pupils are equal, round, and reactive to light.   Cardiovascular:      Rate and Rhythm: Normal rate.   Pulmonary:      Effort: Pulmonary effort is normal. No respiratory distress.   Abdominal:      Palpations: Abdomen is soft.      Tenderness: There is no abdominal tenderness.      Comments: IR BX site, CDI, no redness or swelling    Musculoskeletal:         General: No tenderness or deformity.      Cervical back: Normal range of motion.   Skin:     General: Skin is warm and dry.      Capillary Refill: Capillary refill takes less than 2 seconds.      Coloration: Skin is not jaundiced or pale.   Neurological:      General: No focal deficit present.      Mental Status: He is alert.      Motor: No weakness.   Psychiatric:         Mood and Affect: Mood normal.         Behavior: Behavior normal.             Labs    Recent Labs     08/03/21  0329 08/04/21  0314 08/05/21  0156   WBC 10.9* 11.8* 11.5*   RBC 4.38* 4.48* 4.33*   HEMOGLOBIN 12.7* 13.2* 12.5*   HEMATOCRIT 40.3* 41.7* 40.1*   MCV 92.0 93.1 92.6   MCH 29.0 29.5 28.9   MCHC 31.5* 31.7* 31.2*   RDW 48.6 49.7 49.4   PLATELETCT  405 388 394   MPV 9.1 9.0 9.0     Recent Labs     08/03/21  0329 08/04/21  0314 08/05/21  0156   SODIUM 136 135 135   POTASSIUM 2.9* 3.2* 3.4*   CHLORIDE 100 99 98   CO2 24 24 26   GLUCOSE 193* 119* 206*   BUN 13 15 16   CREATININE 0.73 0.94 0.75   CALCIUM 8.8 8.9 8.8     US-RUQ   Final Result      1.  No evidence for postprocedural hemorrhage      2.  Large, recently biopsied mass in the right lobe of the liver      EC-ECHOCARDIOGRAM COMPLETE W/ CONT   Final Result      CT-ABDOMEN-PELVIS WITH   Final Result      1.  Multiple irregular hypodense liver lesions is consistent with metastatic disease.   2.  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   3.  Nonspecific sclerotic lesion in the right pubic bone. Nuclear medicine bone scan could be performed to further evaluate.      CT-CTA CHEST PULMONARY ARTERY W/ RECONS   Final Result      1.  Limited exam secondary to respiratory motion. No pulmonary embolus is identified.      2.  Multiple pleural-based nodular opacities and bilateral subcentimeter pulmonary nodules. Findings are suspicious for metastatic disease.      3.  Mediastinal and hilar adenopathy, suspicious for metastatic disease.      4.  Hypodense hepatic lesion is poorly characterize, but concerning for malignancy.      5.  Additional adenopathy in the upper abdomen is concerning for malignant metastatic disease as well.            DX-CHEST-PORTABLE (1 VIEW)   Final Result      No evidence of acute cardiopulmonary process.      IR-LIVER BX PROCEDURE    (Results Pending)     Recent Labs     08/03/21  0329 08/04/21  0314 08/05/21  0156   SODIUM 136 135 135   POTASSIUM 2.9* 3.2* 3.4*   CHLORIDE 100 99 98   CO2 24 24 26   GLUCOSE 193* 119* 206*   BUN 13 15 16     INR   Date Value Ref Range Status   08/02/2021 1.17 (H) 0.87 - 1.13 Final     Comment:     INR - Non-therapeutic Reference Range: 0.87-1.13  INR - Therapeutic Reference Range: 2.0-4.0       No results found for: POCINR    No intake or output data in the 24 hours ending 08/05/21 1129   Labs not explicitly included in this progress note were reviewed by the author. Radiology/imaging not explicitly included in this progress note was reviewed by the author.     I have performed a physical exam and reviewed and updated ROS and Plan today (8/5/2021).     20 minutes in directly providing and coordinating care and extensive data review.  No time overlap and excludes procedures.

## 2021-08-05 NOTE — PROGRESS NOTES
Pt transported to discharge unge. Pt sent with supplies from diabetes educator. Monitor room notified.

## 2021-08-05 NOTE — PROGRESS NOTES
Assumed care of patient at bedside report from NOC RN. Updated on POC. Patient currently sleeping. Call light within reach. Whiteboard updated. Fall precautions in place. Bed locked and in lowest position.

## 2021-08-05 NOTE — PROGRESS NOTES
D/C'd.  Discharge instructions provided to pt.  Pt states understanding.  Pt states all questions have been answered.  Copy of discharge provided to pt.  Signed copy in chart.   Pt states that all personal belongings are in possession. Pt escorted off unit with assistance from this RN and CNA without incident.

## 2021-08-05 NOTE — PROGRESS NOTES
Pt back from IR. Pt complaining of abdominal pain. Pt on 4 L o2 with oxygen saturation of 94%. MD Solomon notified.

## 2021-08-05 NOTE — PROGRESS NOTES
[facility] Medicine Daily Progress Note    Date of Service  8/4/2021    Chief Complaint  Jose Alvarez is a 71 y.o. male admitted 8/2/2021 with shortness of breath    Hospital Course  71-year-old male with a past medical history of obesity, LUCIO on CPAP, diabetes, hypertension and asbestos exposure presented with shortness of breath, night sweats and poor appetite.  CTA chest showed no PE but revealed pleural-based nodules, pulmonary nodules and a hepatic lesion.  Pulmonology was consulted and recommended CT-guided biopsy.      Interval Problem Update  Patient underwent ultrasound-guided biopsy of right liver mass.  Post procedure he developed hypoxia and was placed on 4 L of oxygen by nasal cannula.  He also reports severe pain will be treated with IV morphine.  Patient will be monitored overnight      I have personally seen and examined the patient at bedside. I discussed the plan of care with patient and bedside RN.    Consultants/Specialty  pulmonary and IR    Code Status  Full Code    Disposition  Patient is not medically cleared.   Anticipate discharge to to home with close outpatient follow-up.  I have placed the appropriate orders for post-discharge needs.    Review of Systems  Review of Systems   All other systems reviewed and are negative.       Physical Exam  Temp:  [36.2 °C (97.2 °F)-37 °C (98.6 °F)] 36.2 °C (97.2 °F)  Pulse:  [] 86  Resp:  [14-20] 17  BP: (116-162)/(58-81) 151/79  SpO2:  [90 %-98 %] 97 %    Physical Exam  Vitals and nursing note reviewed.   Constitutional:       General: He is not in acute distress.     Appearance: He is obese.   HENT:      Head: Normocephalic and atraumatic.      Nose: Nose normal.      Mouth/Throat:      Mouth: Mucous membranes are moist.   Eyes:      Extraocular Movements: Extraocular movements intact.      Conjunctiva/sclera: Conjunctivae normal.      Pupils: Pupils are equal, round, and reactive to light.   Cardiovascular:      Rate and Rhythm: Normal rate and  regular rhythm.      Pulses: Normal pulses.      Heart sounds: Normal heart sounds.   Pulmonary:      Effort: Pulmonary effort is normal. No respiratory distress.      Breath sounds: Normal breath sounds. No wheezing, rhonchi or rales.   Abdominal:      General: Bowel sounds are normal. There is no distension.      Palpations: Abdomen is soft.      Tenderness: There is no abdominal tenderness.   Musculoskeletal:         General: No swelling or tenderness. Normal range of motion.      Cervical back: Normal range of motion and neck supple.   Lymphadenopathy:      Cervical: No cervical adenopathy.   Skin:     General: Skin is warm.      Coloration: Skin is not jaundiced.      Findings: No rash.   Neurological:      General: No focal deficit present.      Mental Status: He is alert and oriented to person, place, and time.      Cranial Nerves: No cranial nerve deficit.      Motor: No weakness.   Psychiatric:         Mood and Affect: Mood normal.         Behavior: Behavior normal.         Fluids  No intake or output data in the 24 hours ending 08/04/21 2136    Laboratory  Recent Labs     08/02/21  1200 08/03/21  0329 08/04/21  0314   WBC 10.9* 10.9* 11.8*   RBC 4.57* 4.38* 4.48*   HEMOGLOBIN 13.4* 12.7* 13.2*   HEMATOCRIT 41.6* 40.3* 41.7*   MCV 91.0 92.0 93.1   MCH 29.3 29.0 29.5   MCHC 32.2* 31.5* 31.7*   RDW 47.6 48.6 49.7   PLATELETCT 435 405 388   MPV 8.8* 9.1 9.0     Recent Labs     08/02/21  1200 08/03/21  0329 08/04/21  0314   SODIUM 138 136 135   POTASSIUM 3.4* 2.9* 3.2*   CHLORIDE 100 100 99   CO2 23 24 24   GLUCOSE 210* 193* 119*   BUN 12 13 15   CREATININE 0.85 0.73 0.94   CALCIUM 8.9 8.8 8.9     Recent Labs     08/02/21  1225   APTT 34.2   INR 1.17*         Recent Labs     08/03/21  0329   TRIGLYCERIDE 149   HDL 20*   LDL 82       Imaging  US-RUQ   Final Result      1.  No evidence for postprocedural hemorrhage      2.  Large, recently biopsied mass in the right lobe of the liver      EC-ECHOCARDIOGRAM  COMPLETE W/ CONT   Final Result      CT-ABDOMEN-PELVIS WITH   Final Result      1.  Multiple irregular hypodense liver lesions is consistent with metastatic disease.   2.  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   3.  Nonspecific sclerotic lesion in the right pubic bone. Nuclear medicine bone scan could be performed to further evaluate.      CT-CTA CHEST PULMONARY ARTERY W/ RECONS   Final Result      1.  Limited exam secondary to respiratory motion. No pulmonary embolus is identified.      2.  Multiple pleural-based nodular opacities and bilateral subcentimeter pulmonary nodules. Findings are suspicious for metastatic disease.      3.  Mediastinal and hilar adenopathy, suspicious for metastatic disease.      4.  Hypodense hepatic lesion is poorly characterize, but concerning for malignancy.      5.  Additional adenopathy in the upper abdomen is concerning for malignant metastatic disease as well.            DX-CHEST-PORTABLE (1 VIEW)   Final Result      No evidence of acute cardiopulmonary process.      IR-LIVER BX PROCEDURE    (Results Pending)        Assessment/Plan  * Acute respiratory failure with hypoxia (HCC)  Assessment & Plan  In the setting of pneumonia and recent preprocedural sedation  Patient is requiring 4 L of oxygen via nasal cannula  RT protocol echo ordered                  Liver mass  Assessment & Plan  Status post ultrasound-guided biopsy  Follow-up biopsy results as outpatient with PCP      Pneumonia due to infectious organism  Assessment & Plan  Patient has been started on IV Ceftriaxone and azithromycin  pro calcitonin was elevated  RT protocol  Continue supplemental oxygen, wean as tolerated  Follow blood cultures      Leukocytosis  Assessment & Plan        Chronic renal insufficiency, stage 3 (moderate) (HCC)- (present on admission)  Assessment & Plan  Stable    Morbid obesity (HCC)- (present on admission)  Assessment & Plan  When better, healthy diet,  lifestyle, exercise and weight loss  Defer bariatric referral to PCP    Essential hypertension- (present on admission)  Assessment & Plan  Uncontrolled  PRN IV antihypertensives  Continue current home meds and their doses,adjust doses depending on response.    Asthma due to environmental allergies- (present on admission)  Assessment & Plan  Currently not exacerbatingPRN duonebs    Type 2 diabetes mellitus with neurologic complication, with long-term current use of insulin (HCC)- (present on admission)  Assessment & Plan  Uncontrolled  For now will do current regimen  Tend BGs and adjust insulin regimen depending on results  Diabetes education consulted.       VTE prophylaxis: SCDs/TEDs    I have performed a physical exam and reviewed and updated ROS and Plan today (8/4/2021). In review of yesterday's note (8/3/2021), there are no changes except as documented above.

## 2021-08-06 LAB
BACTERIA BLD CULT: ABNORMAL
BACTERIA BLD CULT: ABNORMAL
SIGNIFICANT IND 70042: ABNORMAL
SITE SITE: ABNORMAL
SOURCE SOURCE: ABNORMAL

## 2021-08-06 NOTE — PROGRESS NOTES
Diabetes education: Met with pt prior to discharge. Pt states he makes too much money to qualify for assistance program, only med sample given by endo was Trulicity ( which he would like to continue with samples and PA). He states his PCP has been managing his diabetes.  Briefly reviewed 70/30 vs regular.  Pt is interested in follow up appointment with Dr. Nielsen. He had on he had to cancel as he was sick and has yet to reschedule. Pt to go home with his box of syringes.

## 2021-08-07 ENCOUNTER — APPOINTMENT (OUTPATIENT)
Dept: RADIOLOGY | Facility: MEDICAL CENTER | Age: 71
End: 2021-08-07
Attending: EMERGENCY MEDICINE
Payer: MEDICARE

## 2021-08-07 ENCOUNTER — HOSPITAL ENCOUNTER (EMERGENCY)
Facility: MEDICAL CENTER | Age: 71
End: 2021-08-08
Attending: EMERGENCY MEDICINE
Payer: MEDICARE

## 2021-08-07 DIAGNOSIS — R91.1 PULMONARY NODULE: ICD-10-CM

## 2021-08-07 DIAGNOSIS — R06.00 DYSPNEA, UNSPECIFIED TYPE: ICD-10-CM

## 2021-08-07 DIAGNOSIS — I25.10 CORONARY ARTERY CALCIFICATION: ICD-10-CM

## 2021-08-07 DIAGNOSIS — I25.84 CORONARY ARTERY CALCIFICATION: ICD-10-CM

## 2021-08-07 LAB
ALBUMIN SERPL BCP-MCNC: 3.1 G/DL (ref 3.2–4.9)
ALBUMIN/GLOB SERPL: 0.6 G/DL
ALP SERPL-CCNC: 437 U/L (ref 30–99)
ALT SERPL-CCNC: 56 U/L (ref 2–50)
ANION GAP SERPL CALC-SCNC: 15 MMOL/L (ref 7–16)
AST SERPL-CCNC: 33 U/L (ref 12–45)
BASOPHILS # BLD AUTO: 0.4 % (ref 0–1.8)
BASOPHILS # BLD: 0.05 K/UL (ref 0–0.12)
BILIRUB SERPL-MCNC: 1 MG/DL (ref 0.1–1.5)
BUN SERPL-MCNC: 14 MG/DL (ref 8–22)
CALCIUM SERPL-MCNC: 9.4 MG/DL (ref 8.5–10.5)
CHLORIDE SERPL-SCNC: 95 MMOL/L (ref 96–112)
CO2 SERPL-SCNC: 22 MMOL/L (ref 20–33)
CREAT SERPL-MCNC: 0.79 MG/DL (ref 0.5–1.4)
D DIMER PPP IA.FEU-MCNC: 2.83 UG/ML (FEU) (ref 0–0.5)
EKG IMPRESSION: NORMAL
EKG IMPRESSION: NORMAL
EOSINOPHIL # BLD AUTO: 0.32 K/UL (ref 0–0.51)
EOSINOPHIL NFR BLD: 2.9 % (ref 0–6.9)
ERYTHROCYTE [DISTWIDTH] IN BLOOD BY AUTOMATED COUNT: 47.8 FL (ref 35.9–50)
GLOBULIN SER CALC-MCNC: 4.9 G/DL (ref 1.9–3.5)
GLUCOSE SERPL-MCNC: 257 MG/DL (ref 65–99)
HCT VFR BLD AUTO: 41.5 % (ref 42–52)
HGB BLD-MCNC: 13.3 G/DL (ref 14–18)
IMM GRANULOCYTES # BLD AUTO: 0.08 K/UL (ref 0–0.11)
IMM GRANULOCYTES NFR BLD AUTO: 0.7 % (ref 0–0.9)
LYMPHOCYTES # BLD AUTO: 0.62 K/UL (ref 1–4.8)
LYMPHOCYTES NFR BLD: 5.6 % (ref 22–41)
MCH RBC QN AUTO: 29 PG (ref 27–33)
MCHC RBC AUTO-ENTMCNC: 32 G/DL (ref 33.7–35.3)
MCV RBC AUTO: 90.4 FL (ref 81.4–97.8)
MONOCYTES # BLD AUTO: 0.54 K/UL (ref 0–0.85)
MONOCYTES NFR BLD AUTO: 4.9 % (ref 0–13.4)
NEUTROPHILS # BLD AUTO: 9.51 K/UL (ref 1.82–7.42)
NEUTROPHILS NFR BLD: 85.5 % (ref 44–72)
NRBC # BLD AUTO: 0 K/UL
NRBC BLD-RTO: 0 /100 WBC
NT-PROBNP SERPL IA-MCNC: 313 PG/ML (ref 0–125)
PLATELET # BLD AUTO: 403 K/UL (ref 164–446)
PMV BLD AUTO: 9.1 FL (ref 9–12.9)
POTASSIUM SERPL-SCNC: 3.6 MMOL/L (ref 3.6–5.5)
PROT SERPL-MCNC: 8 G/DL (ref 6–8.2)
RBC # BLD AUTO: 4.59 M/UL (ref 4.7–6.1)
SODIUM SERPL-SCNC: 132 MMOL/L (ref 135–145)
TROPONIN T SERPL-MCNC: 29 NG/L (ref 6–19)
WBC # BLD AUTO: 11.1 K/UL (ref 4.8–10.8)

## 2021-08-07 PROCEDURE — 80053 COMPREHEN METABOLIC PANEL: CPT

## 2021-08-07 PROCEDURE — 85379 FIBRIN DEGRADATION QUANT: CPT

## 2021-08-07 PROCEDURE — 85025 COMPLETE CBC W/AUTO DIFF WBC: CPT

## 2021-08-07 PROCEDURE — 83880 ASSAY OF NATRIURETIC PEPTIDE: CPT

## 2021-08-07 PROCEDURE — 700117 HCHG RX CONTRAST REV CODE 255: Performed by: EMERGENCY MEDICINE

## 2021-08-07 PROCEDURE — 99284 EMERGENCY DEPT VISIT MOD MDM: CPT

## 2021-08-07 PROCEDURE — 93005 ELECTROCARDIOGRAM TRACING: CPT

## 2021-08-07 PROCEDURE — 71275 CT ANGIOGRAPHY CHEST: CPT | Mod: ME

## 2021-08-07 PROCEDURE — 93005 ELECTROCARDIOGRAM TRACING: CPT | Performed by: EMERGENCY MEDICINE

## 2021-08-07 PROCEDURE — 71045 X-RAY EXAM CHEST 1 VIEW: CPT

## 2021-08-07 PROCEDURE — 84484 ASSAY OF TROPONIN QUANT: CPT

## 2021-08-07 RX ADMIN — IOHEXOL 60 ML: 350 INJECTION, SOLUTION INTRAVENOUS at 22:00

## 2021-08-07 ASSESSMENT — FIBROSIS 4 INDEX: FIB4 SCORE: 1.01

## 2021-08-07 NOTE — ED TRIAGE NOTES
Pt to triage via w/c with family c/o worsening sob and cough with generalized weakness. Pt was recently diagnosed with pneumonia. Pt family states she believes he is also allergic to the antibiotics they prescribed bc he woke up this am with terrible itching. nad

## 2021-08-08 VITALS
SYSTOLIC BLOOD PRESSURE: 147 MMHG | DIASTOLIC BLOOD PRESSURE: 71 MMHG | HEIGHT: 67 IN | RESPIRATION RATE: 16 BRPM | BODY MASS INDEX: 42.06 KG/M2 | OXYGEN SATURATION: 93 % | WEIGHT: 268 LBS | TEMPERATURE: 98.4 F | HEART RATE: 86 BPM

## 2021-08-08 NOTE — ED PROVIDER NOTES
ED Provider Note     CHIEF COMPLAINT  Chief Complaint   Patient presents with   • Shortness of Breath   • Weakness   • Dizziness       HPI  Jose Alvarez is a 71 y.o. male who presents To the emergency room and with worsening shortness of breath. Past medical history as documented below. He explained that he was recently seen in the hospital and diagnosed with pneumonia. He was started on on the staff as confirmed. Chart review however he states that he felt quite itchy with this and ultimately stopped it. He did complete this dosing for approximately 1 1/2 days. There is progressive worsening today he went to local urgent care which then evaluated and then felt that maybe there was some worsening in his overall breath sounds and therefore sent him here to the hospital. Itching has since resolved. Denies any other new complaints in his recent evaluation.    He does note that he had finding of multiple nodules both above and below the diaphragm on his recent workup. One of these nodules was biopsied prior to his discharge. He is awaiting results from this.        REVIEW OF SYSTEMS  See HPI for further details. All other systems are negative.     PAST MEDICAL HISTORY   has a past medical history of Asthma, Diabetes (HCC), and Sleep apnea.    SOCIAL HISTORY  Social History     Tobacco Use   • Smoking status: Never Smoker   • Smokeless tobacco: Never Used   Vaping Use   • Vaping Use: Never used   Substance and Sexual Activity   • Alcohol use: Never   • Drug use: Never   • Sexual activity: Not on file       SURGICAL HISTORY   has a past surgical history that includes open reduction; vasectomy; prostatectomy, radical retro; cholecystectomy; and carpal tunnel endoscopic.    CURRENT MEDICATIONS  Home Medications     Reviewed by Melanie Duran R.N. (Registered Nurse) on 08/07/21 at 1523  Med List Status: Partial   Medication Last Dose Status   acetaminophen (TYLENOL) 500 MG Tab  Active   aspirin EC (ECOTRIN) 81 MG Tablet  "Delayed Response  Active   cefdinir (OMNICEF) 300 MG Cap  Active   Continuous Blood Gluc Sensor (FREESTYLE JOSE 2 SENSOR SYSTM) Misc  Active   gabapentin (NEURONTIN) 600 MG tablet  Active   glipiZIDE (GLUCOTROL) 10 MG Tab  Active   ibuprofen (MOTRIN) 200 MG Tab  Active   insulin regular human (HUMULIN/NOVOLIN R)  Active   lisinopril (PRINIVIL) 20 MG Tab  Active   oxyCODONE immediate-release (ROXICODONE) 5 MG Tab  Active   rosuvastatin (CRESTOR) 20 MG Tab  Active                ALLERGIES  Allergies   Allergen Reactions   • Cefdinir    • Metformin      diarrhea   • Penicillins    • Sulfa Drugs        PHYSICAL EXAM  VITAL SIGNS: /71   Pulse 86   Temp 36.9 °C (98.4 °F) (Temporal)   Resp 16   Ht 1.702 m (5' 7\")   Wt 122 kg (268 lb)   SpO2 93%   BMI 41.97 kg/m²  @JULI[[id number]::@   Pulse ox interpretation: I interpret this pulse ox as normal.  Constitutional: Alert in no apparent distress. Morbidly obese  HENT: No signs of trauma, Bilateral external ears normal, Nose normal.   Eyes: Pupils are equal and reactive  Neck: Normal range of motion, No tenderness, Supple  Cardiovascular: Regular rate and rhythm, no murmurs.   Thorax & Lungs: Normal breath sounds, No respiratory distress, No wheezing, No chest tenderness.   Abdomen: Bowel sounds normal, Soft  Skin: Warm, Dry, No erythema, No rash.   Extremities: Intact distal pulses, No edema   Musculoskeletal: Good range of motion in all major joints. No tenderness to palpation or major deformities noted.   Neurologic: Alert , Normal motor function, Normal sensory function, No focal deficits noted.   Psychiatric: Affect normal, Judgment normal, Mood normal.       DIAGNOSTIC STUDIES / PROCEDURES    LABS  Results for orders placed or performed during the hospital encounter of 08/07/21   CBC with Differential   Result Value Ref Range    WBC 11.1 (H) 4.8 - 10.8 K/uL    RBC 4.59 (L) 4.70 - 6.10 M/uL    Hemoglobin 13.3 (L) 14.0 - 18.0 g/dL    Hematocrit 41.5 (L) 42.0 - 52.0 " %    MCV 90.4 81.4 - 97.8 fL    MCH 29.0 27.0 - 33.0 pg    MCHC 32.0 (L) 33.7 - 35.3 g/dL    RDW 47.8 35.9 - 50.0 fL    Platelet Count 403 164 - 446 K/uL    MPV 9.1 9.0 - 12.9 fL    Neutrophils-Polys 85.50 (H) 44.00 - 72.00 %    Lymphocytes 5.60 (L) 22.00 - 41.00 %    Monocytes 4.90 0.00 - 13.40 %    Eosinophils 2.90 0.00 - 6.90 %    Basophils 0.40 0.00 - 1.80 %    Immature Granulocytes 0.70 0.00 - 0.90 %    Nucleated RBC 0.00 /100 WBC    Neutrophils (Absolute) 9.51 (H) 1.82 - 7.42 K/uL    Lymphs (Absolute) 0.62 (L) 1.00 - 4.80 K/uL    Monos (Absolute) 0.54 0.00 - 0.85 K/uL    Eos (Absolute) 0.32 0.00 - 0.51 K/uL    Baso (Absolute) 0.05 0.00 - 0.12 K/uL    Immature Granulocytes (abs) 0.08 0.00 - 0.11 K/uL    NRBC (Absolute) 0.00 K/uL   Comp Metabolic Panel   Result Value Ref Range    Sodium 132 (L) 135 - 145 mmol/L    Potassium 3.6 3.6 - 5.5 mmol/L    Chloride 95 (L) 96 - 112 mmol/L    Co2 22 20 - 33 mmol/L    Anion Gap 15.0 7.0 - 16.0    Glucose 257 (H) 65 - 99 mg/dL    Bun 14 8 - 22 mg/dL    Creatinine 0.79 0.50 - 1.40 mg/dL    Calcium 9.4 8.5 - 10.5 mg/dL    AST(SGOT) 33 12 - 45 U/L    ALT(SGPT) 56 (H) 2 - 50 U/L    Alkaline Phosphatase 437 (H) 30 - 99 U/L    Total Bilirubin 1.0 0.1 - 1.5 mg/dL    Albumin 3.1 (L) 3.2 - 4.9 g/dL    Total Protein 8.0 6.0 - 8.2 g/dL    Globulin 4.9 (H) 1.9 - 3.5 g/dL    A-G Ratio 0.6 g/dL   ESTIMATED GFR   Result Value Ref Range    GFR If African American >60 >60 mL/min/1.73 m 2    GFR If Non African American >60 >60 mL/min/1.73 m 2   proBrain Natriuretic Peptide, NT   Result Value Ref Range    NT-proBNP 313 (H) 0 - 125 pg/mL   TROPONIN   Result Value Ref Range    Troponin T 29 (H) 6 - 19 ng/L   D-DIMER   Result Value Ref Range    D-Dimer Screen 2.83 (H) 0.00 - 0.50 ug/mL (FEU)   EKG (NOW)   Result Value Ref Range    Report       [facility] Emergency Dept.    Test Date:  2021-08-07  Pt Name:    PATIENCE NEIL                    Department: ER  MRN:        [id number]                       Room:  Gender:     Male                         Technician: [id number]  :        1950                   Requested By:ER TRIAGE PROTOCOL  Order #:    [id number]                    Reading MD: Harrison Mejia    Measurements  Intervals                                Axis  Rate:       114                          P:          -12  ME:         164                          QRS:        63  QRSD:       78                           T:          -21  QT:         320  QTc:        441    Interpretive Statements  SINUS TACHYCARDIA  NONSPECIFIC T ABNORMALITIES, INFERIOR LEADS  BASELINE WANDER IN LEAD(S) V1,V2,V4,V5,V6  Compared to ECG 2021 18:48:04  Sinus rhythm no longer present  T-wave abnormality still present  Electronically Signed On 2021 23:37:39 PDT by Harrison Mejia         RADIOLOGY  CT-CTA CHEST PULMONARY ARTERY W/ RECONS   Final Result      1.  No evidence of pulmonary embolus.      2.  Persistent bilateral parenchymal and pleural-based nodules again concerning for metastatic disease      3.  Persistent mediastinal and hilar adenopathy also again concerning for metastatic disease.      4.  Coarse coronary calcifications.            DX-CHEST-PORTABLE (1 VIEW)   Final Result      1.  Cardiomegaly.   2.  Minimal bibasilar atelectasis.   3.  Mild interstitial prominence likely represents mild edema.            COURSE & MEDICAL DECISION MAKING  Pertinent Labs & Imaging studies reviewed. (See chart for details)  71-year-old male with recurrent persistent dyspnea. History as above. Currently undergoing oncologic workup for multiple nodules identified with his recent hospitalization. Today's workup largely benign. No acute hypoxia. Nodules are again identified. He has been known coronary artery calcifications. He did have any known elevated troponin on his last mission which was workup and had a negative echocardiogram. Today is down turning. I discussed the case with Dr. palmer on call for cardiology which  explains the patient can continue with outpatient care and follow-up as it is more or less negative. At this point the patient will be discharged to home with ongoing outpatient care and followed by primary care physician oncology.       The patient will return for worsening symptoms and is stable at the time of discharge. The patient verbalizes understanding and will comply.    FINAL IMPRESSION  1. Dyspnea, unspecified type    2. Pulmonary nodule    3. Coronary artery calcification            Electronically signed by: Harrison Mejia M.D., 8/7/2021 8:35 PM

## 2021-08-08 NOTE — ED NOTES
"Pt diagnosed with pneumonia, given ATB that he later found out he was allergic to. Pt stopped taking. Pt took 1 day of ATB, stopped and is having worsening pain and coughing. Went to urgent care, provider stated he could \"hear the pneumonia moving up the lungs\", told to come to ER.   "

## 2021-08-08 NOTE — ED NOTES
Pt ambulated to and from bathroom, O2 was being monitored and stayed consistent at 92% room air.

## 2021-08-24 NOTE — PROGRESS NOTES
Subjective:   8/25/2021  7:27 AM  Primary care physician:Chidi Yoon D.O.  Referring Provider: Chidi Yoon DO   Medical Oncologist: Dr. Douglas,    Chief Complaint:   Chief Complaint   Patient presents with   • New Patient     NP LIVER/LUNG NODULES REF DR YOON      Diagnosis:   1. Hepatocellular carcinoma (HCC)  Carcinoembryonic Antigen    Chromogranin A    AFP SERUM TUMOR MARKER    José Luis-Gamma-Carboxy Prothrombin    IR-LIVER BX PROCEDURE    HEPATITIS PANEL ACUTE(4 COMPONENTS)   2. Multiple pulmonary nodules  Carcinoembryonic Antigen    Chromogranin A    AFP SERUM TUMOR MARKER    José Luis-Gamma-Carboxy Prothrombin    IR-LIVER BX PROCEDURE    HEPATITIS PANEL ACUTE(4 COMPONENTS)   3. Abdominal pain, unspecified abdominal location  Carcinoembryonic Antigen    Chromogranin A    AFP SERUM TUMOR MARKER    José Luis-Gamma-Carboxy Prothrombin    IR-LIVER BX PROCEDURE    HEPATITIS PANEL ACUTE(4 COMPONENTS)   4. Abnormal CT of the abdomen  Carcinoembryonic Antigen    Chromogranin A    AFP SERUM TUMOR MARKER    José Luis-Gamma-Carboxy Prothrombin    IR-LIVER BX PROCEDURE    HEPATITIS PANEL ACUTE(4 COMPONENTS)       History of presenting illness:  Jose Alvarez  is a pleasant 71 y.o. year old male who presented with a large right lobe liver tumor measuring at its greatest dimension of 10 cm as well as some satellite lesions in the right lobe of the liver posteriorly as well as what appears to be multiple lung nodules as well as adenopathy along the mediastinum.  Patient appears to be in some moderate acute distress secondary to bad back and degenerative spine disease which is causes his right leg to be weak so he is in a wheelchair and has limited mobility.  He depends on his daughter's to help him get around.  In any event he developed pneumonia area in early August and was admitted to the hospital and during that time he underwent a CT scan while working up for Covid.  On the CT scan they saw multiple nodules in the lungs and mediastinal  adenopathy.  It also caught the top of his liver.  I have reviewed the CT scans.  While in the hospital he was sent for ultrasound-guided biopsy unfortunately pathology came back negative.  He was discharged home on pain medication and then a referral was sent to see me.  He presently denies any nausea or vomiting but has had significant decrease in his appetite.  His energy level is poor.  His ECOG performance status is 1-2.  Patient states he had a colonoscopy 3 years ago with no sign of polyp or carcinoma.  He has no family history of cancer.  He has never had hepatitis B or C.  He has no tumor markers.  We have no pathology.  The patient has never been a smoker.  He says he had a cigar once a month.  He has never been a drinker.  His CT scan does not show obvious cirrhosis but he is morbidly obese.  He has been obese for some time.  He has no heart disease.  He has never been diagnosed with any lung disease though he is a diabetic.  And recently he has not needed much insulin.  This is because of his poor intake.      Past Medical History:   Diagnosis Date   • Asthma    • Diabetes (HCC)    • Sleep apnea      Past Surgical History:   Procedure Laterality Date   • CARPAL TUNNEL ENDOSCOPIC     • CHOLECYSTECTOMY     • OPEN REDUCTION     • PROSTATECTOMY, RADICAL RETRO     • VASECTOMY       Allergies   Allergen Reactions   • Cefdinir    • Metformin      diarrhea   • Penicillins    • Sulfa Drugs      Outpatient Encounter Medications as of 8/25/2021   Medication Sig Dispense Refill   • rosuvastatin (CRESTOR) 20 MG Tab Take 20 mg by mouth every evening.     • acetaminophen (TYLENOL) 500 MG Tab Take 1,000 mg by mouth every evening.     • ibuprofen (MOTRIN) 200 MG Tab Take 600 mg by mouth every evening.     • Continuous Blood Gluc Sensor (FREESTYLE JOSE 2 SENSOR SYSTM) Misc 1 Each every 14 days. 2 Each 11   • insulin regular human (HUMULIN/NOVOLIN R) Inject 75 Units under the skin 2 times a day. 75 units, TWICE DAILY      • gabapentin (NEURONTIN) 600 MG tablet Take 600 mg by mouth 2 times a day.     • glipiZIDE (GLUCOTROL) 10 MG Tab Take 10 mg by mouth in the morning, at noon, and at bedtime. Three times daily     • lisinopril (PRINIVIL) 20 MG Tab Take 20 mg by mouth 2 times a day. TWICE DAILY     • aspirin EC (ECOTRIN) 81 MG Tablet Delayed Response Take 1 Tab by mouth every day. 90 Tab 3     No facility-administered encounter medications on file as of 8/25/2021.     Social History     Socioeconomic History   • Marital status:      Spouse name: Not on file   • Number of children: Not on file   • Years of education: Not on file   • Highest education level: Not on file   Occupational History   • Not on file   Tobacco Use   • Smoking status: Never Smoker   • Smokeless tobacco: Never Used   Vaping Use   • Vaping Use: Never used   Substance and Sexual Activity   • Alcohol use: Never   • Drug use: Never   • Sexual activity: Not on file   Other Topics Concern   • Not on file   Social History Narrative   • Not on file     Social Determinants of Health     Financial Resource Strain:    • Difficulty of Paying Living Expenses:    Food Insecurity:    • Worried About Running Out of Food in the Last Year:    • Ran Out of Food in the Last Year:    Transportation Needs:    • Lack of Transportation (Medical):    • Lack of Transportation (Non-Medical):    Physical Activity:    • Days of Exercise per Week:    • Minutes of Exercise per Session:    Stress:    • Feeling of Stress :    Social Connections:    • Frequency of Communication with Friends and Family:    • Frequency of Social Gatherings with Friends and Family:    • Attends [employer] Services:    • Active Member of Clubs or Organizations:    • Attends Club or Organization Meetings:    • Marital Status:    Intimate Partner Violence:    • Fear of Current or Ex-Partner:    • Emotionally Abused:    • Physically Abused:    • Sexually Abused:       Social History     Tobacco Use   Smoking  "Status Never Smoker   Smokeless Tobacco Never Used     Social History     Substance and Sexual Activity   Alcohol Use Never     Social History     Substance and Sexual Activity   Drug Use Never        Family History   Problem Relation Age of Onset   • Alzheimer's Disease Mother    • Diabetes Father    • Heart Disease Father    • No Known Problems Sister    • Diabetes Brother    • Diabetes Sister    • Kidney Disease Sister    • Heart Disease Sister    • Diabetes Brother        Review of Systems   Constitutional: Positive for chills, fever, malaise/fatigue and weight loss.   HENT: Positive for hearing loss and sore throat.    Eyes: Positive for blurred vision.        Yellowing    Respiratory: Positive for cough, shortness of breath and wheezing.         Sleep apnea   Cardiovascular: Positive for chest pain.   Gastrointestinal: Positive for abdominal pain, nausea and vomiting.        Change in appetite    Musculoskeletal: Positive for back pain and joint pain.        Muscle aches  Muscle weakness   Skin:        Jaundice    Neurological: Positive for dizziness, weakness and headaches.   Psychiatric/Behavioral: Positive for depression.        Sleep disturbances   Restless sleep   All other systems reviewed and are negative.       Objective:   /64 (BP Location: Right arm, Patient Position: Sitting, BP Cuff Size: Adult)   Pulse (!) 111   Temp 36.9 °C (98.5 °F) (Temporal)   Ht 1.702 m (5' 7\")   Wt 118 kg (260 lb)   SpO2 93%   BMI 40.72 kg/m²     Physical Exam  Vitals and nursing note reviewed.   Constitutional:       Appearance: He is ill-appearing.   HENT:      Head: Normocephalic.      Mouth/Throat:      Mouth: Mucous membranes are moist.   Eyes:      Extraocular Movements: Extraocular movements intact.      Conjunctiva/sclera: Conjunctivae normal.      Pupils: Pupils are equal, round, and reactive to light.   Cardiovascular:      Rate and Rhythm: Normal rate.      Pulses: Normal pulses.      Heart sounds: " Normal heart sounds.   Pulmonary:      Effort: Pulmonary effort is normal.      Breath sounds: Normal breath sounds.   Abdominal:      General: Abdomen is flat. Bowel sounds are normal.      Palpations: Abdomen is soft.      Tenderness: There is abdominal tenderness.      Comments: On her right upper quadrant tenderness   Musculoskeletal:      Cervical back: Normal range of motion.   Skin:     General: Skin is warm and dry.   Neurological:      General: No focal deficit present.      Mental Status: He is alert and oriented to person, place, and time.   Psychiatric:         Mood and Affect: Mood normal.         Behavior: Behavior normal.         Thought Content: Thought content normal.         Judgment: Judgment normal.         Labs:  Results for PATIENCE NEIL (MRN [id number]) as of 8/24/2021 16:07   Ref. Range 8/7/2021 16:24   WBC Latest Ref Range: 4.8 - 10.8 K/uL 11.1 (H)   RBC Latest Ref Range: 4.70 - 6.10 M/uL 4.59 (L)   Hemoglobin Latest Ref Range: 14.0 - 18.0 g/dL 13.3 (L)   Hematocrit Latest Ref Range: 42.0 - 52.0 % 41.5 (L)   MCV Latest Ref Range: 81.4 - 97.8 fL 90.4   MCH Latest Ref Range: 27.0 - 33.0 pg 29.0   MCHC Latest Ref Range: 33.7 - 35.3 g/dL 32.0 (L)   RDW Latest Ref Range: 35.9 - 50.0 fL 47.8   Platelet Count Latest Ref Range: 164 - 446 K/uL 403   MPV Latest Ref Range: 9.0 - 12.9 fL 9.1   Neutrophils-Polys Latest Ref Range: 44.00 - 72.00 % 85.50 (H)   Neutrophils (Absolute) Latest Ref Range: 1.82 - 7.42 K/uL 9.51 (H)   Lymphocytes Latest Ref Range: 22.00 - 41.00 % 5.60 (L)   Lymphs (Absolute) Latest Ref Range: 1.00 - 4.80 K/uL 0.62 (L)   Monocytes Latest Ref Range: 0.00 - 13.40 % 4.90   Monos (Absolute) Latest Ref Range: 0.00 - 0.85 K/uL 0.54   Eosinophils Latest Ref Range: 0.00 - 6.90 % 2.90   Eos (Absolute) Latest Ref Range: 0.00 - 0.51 K/uL 0.32   Basophils Latest Ref Range: 0.00 - 1.80 % 0.40   Baso (Absolute) Latest Ref Range: 0.00 - 0.12 K/uL 0.05   Immature Granulocytes Latest Ref Range: 0.00  - 0.90 % 0.70   Immature Granulocytes (abs) Latest Ref Range: 0.00 - 0.11 K/uL 0.08   Nucleated RBC Latest Units: /100 WBC 0.00   NRBC (Absolute) Latest Units: K/uL 0.00   Sodium Latest Ref Range: 135 - 145 mmol/L 132 (L)   Potassium Latest Ref Range: 3.6 - 5.5 mmol/L 3.6   Chloride Latest Ref Range: 96 - 112 mmol/L 95 (L)   Co2 Latest Ref Range: 20 - 33 mmol/L 22   Anion Gap Latest Ref Range: 7.0 - 16.0  15.0   Glucose Latest Ref Range: 65 - 99 mg/dL 257 (H)   Bun Latest Ref Range: 8 - 22 mg/dL 14   Creatinine Latest Ref Range: 0.50 - 1.40 mg/dL 0.79   GFR If  Latest Ref Range: >60 mL/min/1.73 m 2 >60   GFR If Non  Latest Ref Range: >60 mL/min/1.73 m 2 >60   Calcium Latest Ref Range: 8.5 - 10.5 mg/dL 9.4   AST(SGOT) Latest Ref Range: 12 - 45 U/L 33   ALT(SGPT) Latest Ref Range: 2 - 50 U/L 56 (H)   Alkaline Phosphatase Latest Ref Range: 30 - 99 U/L 437 (H)   Total Bilirubin Latest Ref Range: 0.1 - 1.5 mg/dL 1.0   Albumin Latest Ref Range: 3.2 - 4.9 g/dL 3.1 (L)   Total Protein Latest Ref Range: 6.0 - 8.2 g/dL 8.0   Globulin Latest Ref Range: 1.9 - 3.5 g/dL 4.9 (H)   A-G Ratio Latest Units: g/dL 0.6       Imagin21 CT [facility]   Per my read,         8/3/21 CT   IMPRESSION:     1.  Multiple irregular hypodense liver lesions is consistent with metastatic disease.  2.  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  3.  Nonspecific sclerotic lesion in the right pubic bone. Nuclear medicine bone scan could be performed to further evaluate.    Pathology: 21     FINAL DIAGNOSIS:     A. Liver mass biopsy:          Biopsy cores demonstrate few small fragments of benign liver           parenchyma with no significant steatosis.          No hepatocellular carcinoma or metastatic malignancy identified    Procedures:  IMPRESSION:     RIGHT liver mass biopsy with ultrasound guidance. If this is nondiagnostic then recommend repeat attempt with CT  guidance.    Diagnosis:     1. Hepatocellular carcinoma (HCC)  Carcinoembryonic Antigen    Chromogranin A    AFP SERUM TUMOR MARKER    José Luis-Gamma-Carboxy Prothrombin    IR-LIVER BX PROCEDURE    HEPATITIS PANEL ACUTE(4 COMPONENTS)   2. Multiple pulmonary nodules  Carcinoembryonic Antigen    Chromogranin A    AFP SERUM TUMOR MARKER    José Luis-Gamma-Carboxy Prothrombin    IR-LIVER BX PROCEDURE    HEPATITIS PANEL ACUTE(4 COMPONENTS)   3. Abdominal pain, unspecified abdominal location  Carcinoembryonic Antigen    Chromogranin A    AFP SERUM TUMOR MARKER    José Luis-Gamma-Carboxy Prothrombin    IR-LIVER BX PROCEDURE    HEPATITIS PANEL ACUTE(4 COMPONENTS)   4. Abnormal CT of the abdomen  Carcinoembryonic Antigen    Chromogranin A    AFP SERUM TUMOR MARKER    José Luis-Gamma-Carboxy Prothrombin    IR-LIVER BX PROCEDURE    HEPATITIS PANEL ACUTE(4 COMPONENTS)           Medical Decision Making:  Today's Assessment / Status / Plan:     In light of the present findings, the patient has multifocal disease in the right lobe of the liver with a large dominant tumor measuring at its greatest dimension 10 cm.  An ultrasound attempt was made to biopsy but was negative on final pathology.  My recommendation as well as the recommendation of interventional radiology is to proceed with a CT-guided biopsy of the liver tumor.  I will order that stat.  In the meantime I will also send the patient for a CEA, CA 19-9, AFP, chromogranin A, AFP L3 percent, and DCP.  Plan is to have a telemedicine visit next week following and his blood test.  This may help guide us as well.  Intermittent dyspnea.  Hopefully will have a final diagnosis next week.  I am also referring the patient to Dr. Douglas per the daughter's request because that is her medical oncologist.    I, Dr. Bear have entered, reviewed and confirmed the above diagnoses related to this patient on this date of service, 8/25/2021  7:27 AM.    He agreed and verbalized his agreement and understanding  with the current plan. I answered all questions and concerns he has at this time and advised him to call at any time in the interim with questions or concerns in regards to his care.    Thank you for allowing me to participate in his care, I will continue to follow closely.       Please note that this dictation was created using voice recognition software. I have made every reasonable attempt to correct obvious errors, but I expect that there are errors of grammar and possibly content that I did not discover before finalizing the note.     Thank you for this consultation and allowing me to participate in your patient's care. If I can be of further service please contact my office.

## 2021-08-25 ENCOUNTER — HOSPITAL ENCOUNTER (OUTPATIENT)
Dept: LAB | Facility: MEDICAL CENTER | Age: 71
End: 2021-08-25
Attending: SURGERY
Payer: MEDICARE

## 2021-08-25 ENCOUNTER — OFFICE VISIT (OUTPATIENT)
Dept: SURGICAL ONCOLOGY | Facility: MEDICAL CENTER | Age: 71
End: 2021-08-25
Payer: MEDICARE

## 2021-08-25 VITALS
HEIGHT: 67 IN | TEMPERATURE: 98.5 F | WEIGHT: 260 LBS | HEART RATE: 111 BPM | OXYGEN SATURATION: 93 % | DIASTOLIC BLOOD PRESSURE: 64 MMHG | BODY MASS INDEX: 40.81 KG/M2 | SYSTOLIC BLOOD PRESSURE: 158 MMHG

## 2021-08-25 DIAGNOSIS — R10.9 ABDOMINAL PAIN, UNSPECIFIED ABDOMINAL LOCATION: ICD-10-CM

## 2021-08-25 DIAGNOSIS — R93.5 ABNORMAL CT OF THE ABDOMEN: ICD-10-CM

## 2021-08-25 DIAGNOSIS — R91.8 MULTIPLE PULMONARY NODULES: ICD-10-CM

## 2021-08-25 DIAGNOSIS — R59.0 MEDIASTINAL ADENOPATHY: ICD-10-CM

## 2021-08-25 DIAGNOSIS — E11.42 TYPE 2 DIABETES MELLITUS WITH DIABETIC POLYNEUROPATHY, WITH LONG-TERM CURRENT USE OF INSULIN (HCC): ICD-10-CM

## 2021-08-25 DIAGNOSIS — C22.0 HEPATOCELLULAR CARCINOMA (HCC): ICD-10-CM

## 2021-08-25 DIAGNOSIS — R16.0 LIVER MASS, RIGHT LOBE: ICD-10-CM

## 2021-08-25 DIAGNOSIS — Z79.4 TYPE 2 DIABETES MELLITUS WITH DIABETIC POLYNEUROPATHY, WITH LONG-TERM CURRENT USE OF INSULIN (HCC): ICD-10-CM

## 2021-08-25 LAB
CANCER AG19-9 SERPL-ACNC: 8.9 U/ML (ref 0–35)
CEA SERPL-MCNC: 2.4 NG/ML (ref 0–3)
HAV IGM SERPL QL IA: NORMAL
HBV CORE IGM SER QL: NORMAL
HBV SURFACE AG SER QL: NORMAL
HCV AB SER QL: NORMAL

## 2021-08-25 PROCEDURE — 82378 CARCINOEMBRYONIC ANTIGEN: CPT | Mod: GA

## 2021-08-25 PROCEDURE — 86301 IMMUNOASSAY TUMOR CA 19-9: CPT | Mod: GA

## 2021-08-25 PROCEDURE — 86316 IMMUNOASSAY TUMOR OTHER: CPT | Mod: GA

## 2021-08-25 PROCEDURE — 82107 ALPHA-FETOPROTEIN L3: CPT

## 2021-08-25 PROCEDURE — 82105 ALPHA-FETOPROTEIN SERUM: CPT

## 2021-08-25 PROCEDURE — 99205 OFFICE O/P NEW HI 60 MIN: CPT | Performed by: SURGERY

## 2021-08-25 PROCEDURE — 36415 COLL VENOUS BLD VENIPUNCTURE: CPT

## 2021-08-25 PROCEDURE — 83951 ONCOPROTEIN DCP: CPT

## 2021-08-25 PROCEDURE — 80074 ACUTE HEPATITIS PANEL: CPT

## 2021-08-25 ASSESSMENT — ENCOUNTER SYMPTOMS
BLURRED VISION: 1
FEVER: 1
ROS SKIN COMMENTS: JAUNDICE
SORE THROAT: 1
NAUSEA: 1
COUGH: 1
ROS GI COMMENTS: CHANGE IN APPETITE
WEAKNESS: 1
DIZZINESS: 1
VOMITING: 1
WHEEZING: 1
CHILLS: 1
SHORTNESS OF BREATH: 1
ABDOMINAL PAIN: 1
HEADACHES: 1
BACK PAIN: 1
WEIGHT LOSS: 1
DEPRESSION: 1

## 2021-08-25 ASSESSMENT — FIBROSIS 4 INDEX: FIB4 SCORE: 0.76

## 2021-08-25 NOTE — PATIENT INSTRUCTIONS
The patient will undergo multiple tumor markers as well as a CT-guided biopsy stat.,  Also referring the patient to Dr. Douglas we will set up a telemedicine visit for next week.

## 2021-08-27 ENCOUNTER — APPOINTMENT (OUTPATIENT)
Dept: ADMISSIONS | Facility: MEDICAL CENTER | Age: 71
End: 2021-08-27
Attending: SURGERY
Payer: MEDICARE

## 2021-08-27 RX ORDER — TRAMADOL HYDROCHLORIDE 50 MG/1
50 TABLET ORAL 2 TIMES DAILY
COMMUNITY
Start: 2021-08-23

## 2021-08-27 RX ORDER — OXYCODONE HYDROCHLORIDE 5 MG/1
5 TABLET ORAL EVERY 6 HOURS PRN
COMMUNITY
Start: 2021-08-23

## 2021-08-28 LAB — CGA SERPL-MCNC: 454 NG/ML (ref 0–103)

## 2021-08-29 LAB
ACARBOXYPROTHROMBIN SERPL-MCNC: 1.6 NG/ML (ref 0–7.4)
AFP L3 MFR SERPL: 85.4 % (ref 0–9.9)
AFP SERPL-MCNC: 15 NG/ML (ref 0–15)
AFP-TM SERPL-MCNC: 38 NG/ML (ref 0–9)

## 2021-08-30 ENCOUNTER — APPOINTMENT (OUTPATIENT)
Dept: RADIOLOGY | Facility: MEDICAL CENTER | Age: 71
End: 2021-08-30
Attending: SURGERY
Payer: MEDICARE

## 2021-08-30 ENCOUNTER — HOSPITAL ENCOUNTER (OUTPATIENT)
Facility: MEDICAL CENTER | Age: 71
End: 2021-08-30
Attending: SURGERY | Admitting: RADIOLOGY
Payer: MEDICARE

## 2021-08-30 VITALS
DIASTOLIC BLOOD PRESSURE: 76 MMHG | WEIGHT: 245.81 LBS | TEMPERATURE: 97.6 F | RESPIRATION RATE: 20 BRPM | OXYGEN SATURATION: 94 % | HEART RATE: 104 BPM | SYSTOLIC BLOOD PRESSURE: 128 MMHG | HEIGHT: 67 IN | BODY MASS INDEX: 38.58 KG/M2

## 2021-08-30 DIAGNOSIS — R10.9 ABDOMINAL PAIN, UNSPECIFIED ABDOMINAL LOCATION: ICD-10-CM

## 2021-08-30 DIAGNOSIS — R93.5 ABNORMAL CT OF THE ABDOMEN: ICD-10-CM

## 2021-08-30 DIAGNOSIS — R91.8 MULTIPLE PULMONARY NODULES: ICD-10-CM

## 2021-08-30 DIAGNOSIS — C22.0 HEPATOCELLULAR CARCINOMA (HCC): ICD-10-CM

## 2021-08-30 LAB
ANION GAP SERPL CALC-SCNC: 12 MMOL/L (ref 7–16)
BUN SERPL-MCNC: 23 MG/DL (ref 8–22)
CALCIUM SERPL-MCNC: 9.6 MG/DL (ref 8.5–10.5)
CHLORIDE SERPL-SCNC: 93 MMOL/L (ref 96–112)
CO2 SERPL-SCNC: 24 MMOL/L (ref 20–33)
CREAT SERPL-MCNC: 1.07 MG/DL (ref 0.5–1.4)
EKG IMPRESSION: NORMAL
ERYTHROCYTE [DISTWIDTH] IN BLOOD BY AUTOMATED COUNT: 49.9 FL (ref 35.9–50)
GLUCOSE SERPL-MCNC: 334 MG/DL (ref 65–99)
HCT VFR BLD AUTO: 41.1 % (ref 42–52)
HGB BLD-MCNC: 13.1 G/DL (ref 14–18)
INR PPP: 1.27 (ref 0.87–1.13)
MCH RBC QN AUTO: 27.8 PG (ref 27–33)
MCHC RBC AUTO-ENTMCNC: 31.9 G/DL (ref 33.7–35.3)
MCV RBC AUTO: 87.3 FL (ref 81.4–97.8)
PATHOLOGY CONSULT NOTE: NORMAL
PLATELET # BLD AUTO: 456 K/UL (ref 164–446)
PMV BLD AUTO: 9.5 FL (ref 9–12.9)
POTASSIUM SERPL-SCNC: 4.6 MMOL/L (ref 3.6–5.5)
PROTHROMBIN TIME: 15.5 SEC (ref 12–14.6)
RBC # BLD AUTO: 4.71 M/UL (ref 4.7–6.1)
SODIUM SERPL-SCNC: 129 MMOL/L (ref 135–145)
WBC # BLD AUTO: 12.2 K/UL (ref 4.8–10.8)

## 2021-08-30 PROCEDURE — 88342 IMHCHEM/IMCYTCHM 1ST ANTB: CPT

## 2021-08-30 PROCEDURE — 93010 ELECTROCARDIOGRAM REPORT: CPT | Performed by: INTERNAL MEDICINE

## 2021-08-30 PROCEDURE — 99152 MOD SED SAME PHYS/QHP 5/>YRS: CPT

## 2021-08-30 PROCEDURE — 700111 HCHG RX REV CODE 636 W/ 250 OVERRIDE (IP): Performed by: RADIOLOGY

## 2021-08-30 PROCEDURE — 93005 ELECTROCARDIOGRAM TRACING: CPT | Performed by: RADIOLOGY

## 2021-08-30 PROCEDURE — 85610 PROTHROMBIN TIME: CPT

## 2021-08-30 PROCEDURE — 80048 BASIC METABOLIC PNL TOTAL CA: CPT

## 2021-08-30 PROCEDURE — 88307 TISSUE EXAM BY PATHOLOGIST: CPT

## 2021-08-30 PROCEDURE — 700105 HCHG RX REV CODE 258: Performed by: RADIOLOGY

## 2021-08-30 PROCEDURE — 160002 HCHG RECOVERY MINUTES (STAT)

## 2021-08-30 PROCEDURE — 85027 COMPLETE CBC AUTOMATED: CPT

## 2021-08-30 PROCEDURE — 700111 HCHG RX REV CODE 636 W/ 250 OVERRIDE (IP)

## 2021-08-30 PROCEDURE — 88341 IMHCHEM/IMCYTCHM EA ADD ANTB: CPT | Mod: 91

## 2021-08-30 RX ORDER — NALOXONE HYDROCHLORIDE 0.4 MG/ML
INJECTION, SOLUTION INTRAMUSCULAR; INTRAVENOUS; SUBCUTANEOUS
Status: DISCONTINUED
Start: 2021-08-30 | End: 2021-08-30 | Stop reason: HOSPADM

## 2021-08-30 RX ORDER — CYCLOBENZAPRINE HCL 10 MG
10 TABLET ORAL 3 TIMES DAILY PRN
COMMUNITY

## 2021-08-30 RX ORDER — SODIUM CHLORIDE 9 MG/ML
1000 INJECTION, SOLUTION INTRAVENOUS CONTINUOUS
Status: DISCONTINUED | OUTPATIENT
Start: 2021-08-30 | End: 2021-08-30 | Stop reason: HOSPADM

## 2021-08-30 RX ORDER — OXYCODONE HYDROCHLORIDE 5 MG/1
5 TABLET ORAL
Status: DISCONTINUED | OUTPATIENT
Start: 2021-08-30 | End: 2021-08-30 | Stop reason: HOSPADM

## 2021-08-30 RX ORDER — OXYCODONE HYDROCHLORIDE 5 MG/1
2.5 TABLET ORAL
Status: DISCONTINUED | OUTPATIENT
Start: 2021-08-30 | End: 2021-08-30 | Stop reason: HOSPADM

## 2021-08-30 RX ORDER — HYDROMORPHONE HYDROCHLORIDE 1 MG/ML
0.25 INJECTION, SOLUTION INTRAMUSCULAR; INTRAVENOUS; SUBCUTANEOUS
Status: DISCONTINUED | OUTPATIENT
Start: 2021-08-30 | End: 2021-08-30 | Stop reason: HOSPADM

## 2021-08-30 RX ORDER — MIDAZOLAM HYDROCHLORIDE 1 MG/ML
INJECTION INTRAMUSCULAR; INTRAVENOUS
Status: COMPLETED
Start: 2021-08-30 | End: 2021-08-30

## 2021-08-30 RX ORDER — ONDANSETRON 2 MG/ML
4 INJECTION INTRAMUSCULAR; INTRAVENOUS PRN
Status: ACTIVE | OUTPATIENT
Start: 2021-08-30 | End: 2021-08-30

## 2021-08-30 RX ORDER — MIDAZOLAM HYDROCHLORIDE 1 MG/ML
.5-2 INJECTION INTRAMUSCULAR; INTRAVENOUS PRN
Status: ACTIVE | OUTPATIENT
Start: 2021-08-30 | End: 2021-08-30

## 2021-08-30 RX ORDER — MELOXICAM 7.5 MG/1
7.5 TABLET ORAL 3 TIMES DAILY
COMMUNITY
End: 2021-10-07

## 2021-08-30 RX ORDER — SODIUM CHLORIDE 9 MG/ML
500 INJECTION, SOLUTION INTRAVENOUS
Status: DISPENSED | OUTPATIENT
Start: 2021-08-30 | End: 2021-08-30

## 2021-08-30 RX ADMIN — MIDAZOLAM HYDROCHLORIDE 1 MG: 1 INJECTION, SOLUTION INTRAMUSCULAR; INTRAVENOUS at 15:46

## 2021-08-30 RX ADMIN — FENTANYL CITRATE 50 MCG: 50 INJECTION, SOLUTION INTRAMUSCULAR; INTRAVENOUS at 15:43

## 2021-08-30 RX ADMIN — MIDAZOLAM HYDROCHLORIDE 1 MG: 1 INJECTION, SOLUTION INTRAMUSCULAR; INTRAVENOUS at 15:43

## 2021-08-30 RX ADMIN — MIDAZOLAM HYDROCHLORIDE 1 MG: 1 INJECTION, SOLUTION INTRAMUSCULAR; INTRAVENOUS at 15:51

## 2021-08-30 RX ADMIN — SODIUM CHLORIDE 1000 ML: 9 INJECTION, SOLUTION INTRAVENOUS at 12:31

## 2021-08-30 RX ADMIN — FENTANYL CITRATE 25 MCG: 50 INJECTION, SOLUTION INTRAMUSCULAR; INTRAVENOUS at 15:51

## 2021-08-30 RX ADMIN — FENTANYL CITRATE 25 MCG: 50 INJECTION, SOLUTION INTRAMUSCULAR; INTRAVENOUS at 15:53

## 2021-08-30 ASSESSMENT — FIBROSIS 4 INDEX: FIB4 SCORE: 0.76

## 2021-08-30 ASSESSMENT — PAIN DESCRIPTION - PAIN TYPE
TYPE: SURGICAL PAIN
TYPE: SURGICAL PAIN
TYPE: ACUTE PAIN
TYPE: SURGICAL PAIN

## 2021-08-30 NOTE — H&P
Physician H&P    Patient ID:  Jose Alvarez  [id number]  71 y.o. male  1950    History:  Primary Diagnosis: ? Liver mass    HPI:  Patient here for repeat biopsy of liver mass    Past Medical History:  has a past medical history of Asthma, Diabetes (HCC), and Sleep apnea.  Past Surgical History:  has a past surgical history that includes open reduction; vasectomy; prostatectomy, radical retro; cholecystectomy; and carpal tunnel endoscopic.  Past Social History:  reports that he has never smoked. He has never used smokeless tobacco. He reports that he does not drink alcohol and does not use drugs.  Past Family History:   Family History   Problem Relation Age of Onset   • Alzheimer's Disease Mother    • Diabetes Father    • Heart Disease Father    • No Known Problems Sister    • Diabetes Brother    • Diabetes Sister    • Kidney Disease Sister    • Heart Disease Sister    • Diabetes Brother      Allergies: Cefdinir, Metformin, Penicillins, and Sulfa drugs    Current Medications:  Prior to Admission medications    Medication Sig Start Date End Date Taking? Authorizing Provider   cyclobenzaprine (FLEXERIL) 10 mg Tab Take 10 mg by mouth 3 times a day as needed for Moderate Pain.   Yes Physician Outpatient   meloxicam (MOBIC) 7.5 MG Tab Take 7.5 mg by mouth in the morning, at noon, and at bedtime.   Yes Physician Outpatient   oxyCODONE immediate-release (ROXICODONE) 5 MG Tab Take 5 mg by mouth every 6 hours as needed. 8/23/21   Physician Outpatient   traMADol (ULTRAM) 50 MG Tab Take 50 mg by mouth 2 times a day. 8/23/21   Physician Outpatient   rosuvastatin (CRESTOR) 20 MG Tab Take 20 mg by mouth every evening.    Physician Outpatient   Continuous Blood Gluc Sensor (FREESTYLE JOSE 2 SENSOR SYSTM) Misc 1 Each every 14 days. 12/1/20   Gumaro Nielsen M.D.   insulin regular human (HUMULIN/NOVOLIN R) Inject 75 Units under the skin 2 times a day. 75 units, TWICE DAILY    Physician Outpatient   gabapentin (NEURONTIN) 600 MG  "tablet Take 600 mg by mouth 2 times a day. 11/11/19   Physician Outpatient   glipiZIDE (GLUCOTROL) 10 MG Tab Take 10 mg by mouth in the morning, at noon, and at bedtime. Three times daily 10/29/19   Physician Outpatient   lisinopril (PRINIVIL) 20 MG Tab Take 20 mg by mouth 2 times a day. TWICE DAILY 10/3/19   Physician Outpatient   aspirin EC (ECOTRIN) 81 MG Tablet Delayed Response Take 1 Tab by mouth every day. 12/27/19   Jason Kelley M.D.       Review of Systems:  ROS  /91   Pulse (!) 110   Temp 36.2 °C (97.1 °F) (Temporal)   Resp 18   Ht 1.702 m (5' 7\")   Wt 112 kg (245 lb 13 oz)   SpO2 91%     Physical Examination:  Physical Exam  Constitutional:       Appearance: He is obese.   Neurological:      General: No focal deficit present.      Mental Status: He is alert.   Psychiatric:         Mood and Affect: Mood normal.         Impression:  Patient here for liver biopsy    Plan:  Liver biopsy.     Pre Procedure Assessment:  Pre-Procedure Assessment - Applicable for patients receiving sedation by non-anesthesia practitioner.  Previous drug history, other anesthetic experiences, potential anesthetic problems and choice of anesthesia assessed, discussed with patient.     No prior complications.  Results of relevant diagnostic studies reviewed.    Plan of Sedation:  IV conscious sedation    ASA 3 - Patient with severe systemic disease        Pre Procedure update:   No changes.    Jd Rogers M.D.  8/30/2021  "

## 2021-08-30 NOTE — OR SURGEON
Immediate Post- Operative Note        PostOp Diagnosis: Liver mass      Procedure(s): liver biopsy right lobe      Estimated Blood Loss: Less than 5 ml        Complications: None            8/30/2021     4:07 PM     Jd Rogers M.D.

## 2021-08-30 NOTE — PROGRESS NOTES
Med rec updated and complete  Allergies reviewed, per family  Interviewed pt with family at bedside with permission from pt.  Pts daughter had a list of medications that they read to this writer  Pts family reports that pt has not been taking some of his medications in the last 2 weeks.   Pts daughter reports no antibiotics in the last 30 days.

## 2021-08-30 NOTE — OR NURSING
1625: Pt arrives to PACU asleep and calm. VSS. Site to right lateral side is CDI.     1700: Pt resting comfortably site is CDI, pt denies pain or nausea. Possible A. Fib noted on monitor and EKG ordered. Dr Rgoers notified by IR GREER Mathews.     1730: EKG shows Sinus Tach. Pts daughter called and notified of pt status.     1820: Bed rest up. Pt denies pain or nausea and report was called to Tip BUTTERFIELD.

## 2021-08-30 NOTE — PROGRESS NOTES
IR Nursing Note:    Patient underwent a liver biopsy by Dr. Rogers. Procedure site was marked by MD and verified using imaging guidance.  Patient was placed in a supine position.  Vitals were taken every 5 minutes and remained stable during procedure (see doc flow sheet for results).  CO2 waveform capnography was monitored and remained 24-29 throughout procedure.  A Tegaderm and gauze dressing was placed over surgical site. Report called to Rebel BUTTERFIELD. Pt transported by walter with RN to [facility] pacu. Core Labs X 3 hand delivered to lab.

## 2021-08-31 NOTE — OR NURSING
1824 - Received pt from [facility]. No complaints of pain or N/V at this time. VSS. Dressing to R side of abdomen  CDI    1856 - Discharge orders received. IV taken out. All belongings returned to pt. Pt changed into clothing with assistance. Pt helped and voided adequately. Discharge instructions given and discussed as well as pain management handout. Pt verbalizes understandings and all questions answered at this time. Pt states they are ready to be D/C home. Prescriptions given to pt and verbalizes understanding of medications. Pt D/C via wheelchair with all belongings with RN

## 2021-08-31 NOTE — DISCHARGE INSTRUCTIONS
Liver Biopsy, Care After  These instructions give you information on caring for yourself after your procedure. Your doctor may also give you more specific instructions. Call your doctor if you have any problems or questions after your procedure.  What can I expect after the procedure?  After the procedure, it is common to have:  · Pain and soreness where the biopsy was done.  · Bruising around the area where the biopsy was done.  · Sleepiness and be tired for a few days.  Follow these instructions at home:  Medicines  · Take over-the-counter and prescription medicines only as told by your doctor.  · If you were prescribed an antibiotic medicine, take it as told by your doctor. Do not stop taking the antibiotic even if you start to feel better.  · Do not take medicines such as aspirin and ibuprofen. These medicines can thin your blood. Do not take these medicines unless your doctor tells you to take them.  · If you are taking prescription pain medicine, take actions to prevent or treat constipation. Your doctor may recommend that you:  ? Drink enough fluid to keep your pee (urine) clear or pale yellow.  ? Take over-the-counter or prescription medicines.  ? Eat foods that are high in fiber, such as fresh fruits and vegetables, whole grains, and beans.  ? Limit foods that are high in fat and processed sugars, such as fried and sweet foods.  Caring for your cut  · Follow instructions from your doctor about how to take care of your cuts from surgery (incisions). Make sure you:  ? Wash your hands with soap and water before you change your bandage (dressing). If you cannot use soap and water, use hand .  ? Change your bandage as told by your doctor.  ? Leave stitches (sutures), skin glue, or skin tape (adhesive) strips in place. They may need to stay in place for 2 weeks or longer. If tape strips get loose and curl up, you may trim the loose edges. Do not remove tape strips completely unless your doctor says it is  okay.  · Check your cuts every day for signs of infection. Check for:  ? Redness, swelling, or more pain.  ? Fluid or blood.  ? Pus or a bad smell.  ? Warmth.  · Do not take baths, swim, or use a hot tub until your doctor says it is okay to do so.  Activity    · Rest at home for 1-2 days or as told by your doctor.  ? Avoid sitting for a long time without moving. Get up to take short walks every 1-2 hours.  · Return to your normal activities as told by your doctor. Ask what activities are safe for you.  · Do not do these things in the first 24 hours:  ? Drive.  ? Use machinery.  ? Take a bath or shower.  · Do not lift more than 10 pounds (4.5 kg) or play contact sports for the first 2 weeks.  General instructions  · Do not drink alcohol in the first week after the procedure.  · Have someone stay with you for at least 24 hours after the procedure.  · Get your test results. Ask your doctor or the department that is doing the test:  ? When will my results be ready?  ? How will I get my results?  ? What are my treatment options?  ? What other tests do I need?  ? What are my next steps?  · Keep all follow-up visits as told by your doctor. This is important.  Contact a doctor if:  · A cut bleeds and leaves more than just a small spot of blood.  · A cut is red, puffs up (swells), or hurts more than before.  · Fluid or something else comes from a cut.  · A cut smells bad.  · You have a fever or chills.  Get help right away if:  · You have swelling, bloating, or pain in your belly (abdomen).  · You get dizzy or faint.  · You have a rash.  · You feel sick to your stomach (nauseous) or throw up (vomit).  · You have trouble breathing, feel short of breath, or feel faint.  · Your chest hurts.  · You have problems talking or seeing.  · You have trouble with your balance or moving your arms or legs.  Summary  · After the procedure, it is common to have pain, soreness, bruising, and tiredness.  · Your doctor will tell you how to  take care of yourself at home. Change your bandage, take your medicines, and limit your activities as told by your doctor.  · Call your doctor if you have symptoms of infection. Get help right away if your belly swells, your cut bleeds a lot, or you have trouble talking or breathing.  This information is not intended to replace advice given to you by your health care provider. Make sure you discuss any questions you have with your health care provider.  Document Released: 09/26/2009 Document Revised: 12/28/2018 Document Reviewed: 12/28/2018  [employer] © 2020 [employer]    ACTIVITY: Rest and take it easy for the first 24 hours.  A responsible adult is recommended to remain with you during that time.  It is normal to feel sleepy.  We encourage you to not do anything that requires balance, judgment or coordination.    MILD FLU-LIKE SYMPTOMS ARE NORMAL. YOU MAY EXPERIENCE GENERALIZED MUSCLE ACHES, THROAT IRRITATION, HEADACHE AND/OR SOME NAUSEA.    FOR 24 HOURS DO NOT:  Drive, operate machinery or run household appliances.  Drink beer or alcoholic beverages.   Make important decisions or sign legal documents.    SPECIAL INSTRUCTIONS:     DIET: To avoid nausea, slowly advance diet as tolerated, avoiding spicy or greasy foods for the first day.  Add more substantial food to your diet according to your physician's instructions. INCREASE FLUIDS AND FIBER TO AVOID CONSTIPATION.    SURGICAL DRESSING/BATHING: Keep dressing clean and dry. Ok to shower. Do not take a bath, use hot tub, or submerge the site in water until your follow-up appointment.    FOLLOW-UP APPOINTMENT:  A follow-up appointment should be arranged with your doctor in 1-2 weeks; call to schedule.    You should CALL YOUR PHYSICIAN if you develop:  Fever greater than 101 degrees F.  Pain not relieved by medication, or persistent nausea or vomiting.  Excessive bleeding (blood soaking through dressing) or unexpected drainage from the  wound.  Extreme redness or swelling around the incision site, drainage of pus or foul smelling drainage.  Inability to urinate or empty your bladder within 8 hours.  Problems with breathing or chest pain.    You should call 911 if you develop problems with breathing or chest pain.  If you are unable to contact your doctor or surgical center, you should go to the nearest emergency room or urgent care center.  Physician's telephone #: Dr. Rogers, [phone]    If any questions arise, call your doctor.  If your doctor is not available, please feel free to call the Surgical Center at [phone]. The Contact Center is open Monday through Friday 7AM to 5PM and may speak to a nurse at [phone], or toll free at [phone].     A registered nurse may call you a few days after your surgery to see how you are doing after your procedure.    MEDICATIONS: Resume taking daily medication.  Take prescribed pain medication with food.  If no medication is prescribed, you may take non-aspirin pain medication if needed.  PAIN MEDICATION CAN BE VERY CONSTIPATING.  Take a stool softener or laxative such as senokot, pericolace, or milk of magnesia if needed.    If your physician has prescribed pain medication that includes Acetaminophen (Tylenol), do not take additional Acetaminophen (Tylenol) while taking the prescribed medication.    Depression / Suicide Risk    As you are discharged from this [facility] facility, it is important to learn how to keep safe from harming yourself.    Recognize the warning signs:  Abrupt changes in personality, positive or negative- including increase in energy   Giving away possessions  Change in eating patterns- significant weight changes-  positive or negative  Change in sleeping patterns- unable to sleep or sleeping all the time   Unwillingness or inability to communicate  Depression  Unusual sadness, discouragement and loneliness  Talk of wanting to die  Neglect of personal  appearance   Rebelliousness- reckless behavior  Withdrawal from people/activities they love  Confusion- inability to concentrate     If you or a loved one observes any of these behaviors or has concerns about self-harm, here's what you can do:  Talk about it- your feelings and reasons for harming yourself  Remove any means that you might use to hurt yourself (examples: pills, rope, extension cords, firearm)  Get professional help from the community (Mental Health, Substance Abuse, psychological counseling)  Do not be alone:Call your Safe Contact- someone whom you trust who will be there for you.  Call your local CRISIS HOTLINE 017-4663 or 667-717-3405  Call your local Children's Mobile Crisis Response Team [place] (543) 526-8406 or www.[employer]  Call the toll free National Suicide Prevention Hotlines   National Suicide Prevention Lifeline 660-036-LAPL (1329)  National Hope Line Network 800-SUICIDE (501-0689)

## 2021-09-22 ENCOUNTER — HOSPITAL ENCOUNTER (OUTPATIENT)
Dept: LAB | Facility: MEDICAL CENTER | Age: 71
End: 2021-09-22
Payer: MEDICARE

## 2021-09-22 LAB
ALBUMIN SERPL BCP-MCNC: 2.8 G/DL (ref 3.2–4.9)
ALBUMIN/GLOB SERPL: 0.7 G/DL
ALP SERPL-CCNC: 258 U/L (ref 30–99)
ALT SERPL-CCNC: 14 U/L (ref 2–50)
ANION GAP SERPL CALC-SCNC: 22 MMOL/L (ref 7–16)
AST SERPL-CCNC: 20 U/L (ref 12–45)
BILIRUB SERPL-MCNC: 1.1 MG/DL (ref 0.1–1.5)
BUN SERPL-MCNC: 31 MG/DL (ref 8–22)
CALCIUM SERPL-MCNC: 10.4 MG/DL (ref 8.5–10.5)
CHLORIDE SERPL-SCNC: 99 MMOL/L (ref 96–112)
CO2 SERPL-SCNC: 18 MMOL/L (ref 20–33)
CREAT SERPL-MCNC: 0.98 MG/DL (ref 0.5–1.4)
GLOBULIN SER CALC-MCNC: 4.1 G/DL (ref 1.9–3.5)
GLUCOSE SERPL-MCNC: 219 MG/DL (ref 65–99)
INR PPP: 1.34 (ref 0.87–1.13)
LDH SERPL L TO P-CCNC: 139 U/L (ref 107–266)
POTASSIUM SERPL-SCNC: 4.8 MMOL/L (ref 3.6–5.5)
PROT SERPL-MCNC: 6.9 G/DL (ref 6–8.2)
PROTHROMBIN TIME: 16.2 SEC (ref 12–14.6)
SODIUM SERPL-SCNC: 139 MMOL/L (ref 135–145)

## 2021-09-22 PROCEDURE — 80053 COMPREHEN METABOLIC PANEL: CPT

## 2021-09-22 PROCEDURE — 85610 PROTHROMBIN TIME: CPT

## 2021-09-22 PROCEDURE — 83615 LACTATE (LD) (LDH) ENZYME: CPT

## 2021-09-22 PROCEDURE — 82105 ALPHA-FETOPROTEIN SERUM: CPT

## 2021-09-24 LAB — AFP-TM SERPL-MCNC: 108 NG/ML (ref 0–9)
